# Patient Record
Sex: MALE | Race: WHITE | NOT HISPANIC OR LATINO
[De-identification: names, ages, dates, MRNs, and addresses within clinical notes are randomized per-mention and may not be internally consistent; named-entity substitution may affect disease eponyms.]

---

## 2017-03-03 ENCOUNTER — RX RENEWAL (OUTPATIENT)
Age: 72
End: 2017-03-03

## 2017-03-27 ENCOUNTER — APPOINTMENT (OUTPATIENT)
Dept: PULMONOLOGY | Facility: CLINIC | Age: 72
End: 2017-03-27

## 2017-03-27 VITALS — DIASTOLIC BLOOD PRESSURE: 76 MMHG | WEIGHT: 264 LBS | BODY MASS INDEX: 41.97 KG/M2 | SYSTOLIC BLOOD PRESSURE: 130 MMHG

## 2017-03-27 VITALS — HEART RATE: 53 BPM | OXYGEN SATURATION: 94 %

## 2017-04-13 ENCOUNTER — MESSAGE (OUTPATIENT)
Age: 72
End: 2017-04-13

## 2017-04-19 ENCOUNTER — APPOINTMENT (OUTPATIENT)
Dept: THORACIC SURGERY | Facility: CLINIC | Age: 72
End: 2017-04-19

## 2017-04-19 VITALS
SYSTOLIC BLOOD PRESSURE: 127 MMHG | OXYGEN SATURATION: 96 % | WEIGHT: 262 LBS | DIASTOLIC BLOOD PRESSURE: 73 MMHG | BODY MASS INDEX: 41.61 KG/M2 | HEART RATE: 64 BPM | HEIGHT: 66.5 IN | RESPIRATION RATE: 20 BRPM

## 2017-04-28 ENCOUNTER — OUTPATIENT (OUTPATIENT)
Dept: OUTPATIENT SERVICES | Facility: HOSPITAL | Age: 72
LOS: 1 days | End: 2017-04-28

## 2017-04-28 ENCOUNTER — APPOINTMENT (OUTPATIENT)
Dept: NUCLEAR MEDICINE | Facility: CLINIC | Age: 72
End: 2017-04-28

## 2017-04-28 DIAGNOSIS — Z85.118 PERSONAL HISTORY OF OTHER MALIGNANT NEOPLASM OF BRONCHUS AND LUNG: Chronic | ICD-10-CM

## 2017-04-28 DIAGNOSIS — Z98.89 OTHER SPECIFIED POSTPROCEDURAL STATES: Chronic | ICD-10-CM

## 2017-04-28 DIAGNOSIS — Z00.8 ENCOUNTER FOR OTHER GENERAL EXAMINATION: ICD-10-CM

## 2017-05-15 ENCOUNTER — APPOINTMENT (OUTPATIENT)
Dept: THORACIC SURGERY | Facility: CLINIC | Age: 72
End: 2017-05-15

## 2017-05-15 VITALS
WEIGHT: 262 LBS | SYSTOLIC BLOOD PRESSURE: 128 MMHG | HEIGHT: 66 IN | BODY MASS INDEX: 42.11 KG/M2 | OXYGEN SATURATION: 95 % | RESPIRATION RATE: 20 BRPM | DIASTOLIC BLOOD PRESSURE: 63 MMHG | HEART RATE: 57 BPM

## 2017-05-19 ENCOUNTER — OUTPATIENT (OUTPATIENT)
Dept: OUTPATIENT SERVICES | Facility: HOSPITAL | Age: 72
LOS: 1 days | End: 2017-05-19
Payer: COMMERCIAL

## 2017-05-19 VITALS
RESPIRATION RATE: 16 BRPM | DIASTOLIC BLOOD PRESSURE: 82 MMHG | HEART RATE: 70 BPM | WEIGHT: 260.15 LBS | HEIGHT: 67 IN | SYSTOLIC BLOOD PRESSURE: 134 MMHG | TEMPERATURE: 97 F

## 2017-05-19 DIAGNOSIS — R22.2 LOCALIZED SWELLING, MASS AND LUMP, TRUNK: ICD-10-CM

## 2017-05-19 DIAGNOSIS — Z01.818 ENCOUNTER FOR OTHER PREPROCEDURAL EXAMINATION: ICD-10-CM

## 2017-05-19 DIAGNOSIS — Z98.89 OTHER SPECIFIED POSTPROCEDURAL STATES: Chronic | ICD-10-CM

## 2017-05-19 DIAGNOSIS — Z85.118 PERSONAL HISTORY OF OTHER MALIGNANT NEOPLASM OF BRONCHUS AND LUNG: Chronic | ICD-10-CM

## 2017-05-19 LAB
ANION GAP SERPL CALC-SCNC: 13 MMOL/L — SIGNIFICANT CHANGE UP (ref 5–17)
APTT BLD: 30.1 SEC — SIGNIFICANT CHANGE UP (ref 27.5–37.4)
BASOPHILS # BLD AUTO: 0 K/UL — SIGNIFICANT CHANGE UP (ref 0–0.2)
BASOPHILS NFR BLD AUTO: 0.1 % — SIGNIFICANT CHANGE UP (ref 0–2)
BUN SERPL-MCNC: 25 MG/DL — HIGH (ref 8–20)
CALCIUM SERPL-MCNC: 9.5 MG/DL — SIGNIFICANT CHANGE UP (ref 8.6–10.2)
CHLORIDE SERPL-SCNC: 103 MMOL/L — SIGNIFICANT CHANGE UP (ref 98–107)
CO2 SERPL-SCNC: 26 MMOL/L — SIGNIFICANT CHANGE UP (ref 22–29)
CREAT SERPL-MCNC: 0.81 MG/DL — SIGNIFICANT CHANGE UP (ref 0.5–1.3)
EOSINOPHIL # BLD AUTO: 0.1 K/UL — SIGNIFICANT CHANGE UP (ref 0–0.5)
EOSINOPHIL NFR BLD AUTO: 0.8 % — SIGNIFICANT CHANGE UP (ref 0–5)
GLUCOSE SERPL-MCNC: 109 MG/DL — SIGNIFICANT CHANGE UP (ref 70–115)
HCT VFR BLD CALC: 44.5 % — SIGNIFICANT CHANGE UP (ref 42–52)
HGB BLD-MCNC: 14.9 G/DL — SIGNIFICANT CHANGE UP (ref 14–18)
INR BLD: 0.96 RATIO — SIGNIFICANT CHANGE UP (ref 0.88–1.16)
LYMPHOCYTES # BLD AUTO: 1.5 K/UL — SIGNIFICANT CHANGE UP (ref 1–4.8)
LYMPHOCYTES # BLD AUTO: 21.7 % — SIGNIFICANT CHANGE UP (ref 20–55)
MCHC RBC-ENTMCNC: 28.7 PG — SIGNIFICANT CHANGE UP (ref 27–31)
MCHC RBC-ENTMCNC: 33.5 G/DL — SIGNIFICANT CHANGE UP (ref 32–36)
MCV RBC AUTO: 85.7 FL — SIGNIFICANT CHANGE UP (ref 80–94)
MONOCYTES # BLD AUTO: 0.7 K/UL — SIGNIFICANT CHANGE UP (ref 0–0.8)
MONOCYTES NFR BLD AUTO: 9.4 % — SIGNIFICANT CHANGE UP (ref 3–10)
NEUTROPHILS # BLD AUTO: 4.8 K/UL — SIGNIFICANT CHANGE UP (ref 1.8–8)
NEUTROPHILS NFR BLD AUTO: 68 % — SIGNIFICANT CHANGE UP (ref 37–73)
PLATELET # BLD AUTO: 176 K/UL — SIGNIFICANT CHANGE UP (ref 150–400)
POTASSIUM SERPL-MCNC: 4.2 MMOL/L — SIGNIFICANT CHANGE UP (ref 3.5–5.3)
POTASSIUM SERPL-SCNC: 4.2 MMOL/L — SIGNIFICANT CHANGE UP (ref 3.5–5.3)
PROTHROM AB SERPL-ACNC: 10.6 SEC — SIGNIFICANT CHANGE UP (ref 9.8–12.7)
RBC # BLD: 5.19 M/UL — SIGNIFICANT CHANGE UP (ref 4.6–6.2)
RBC # FLD: 14.8 % — SIGNIFICANT CHANGE UP (ref 11–15.6)
SODIUM SERPL-SCNC: 142 MMOL/L — SIGNIFICANT CHANGE UP (ref 135–145)
WBC # BLD: 7.1 K/UL — SIGNIFICANT CHANGE UP (ref 4.8–10.8)
WBC # FLD AUTO: 7.1 K/UL — SIGNIFICANT CHANGE UP (ref 4.8–10.8)

## 2017-05-19 PROCEDURE — 85730 THROMBOPLASTIN TIME PARTIAL: CPT

## 2017-05-19 PROCEDURE — 85027 COMPLETE CBC AUTOMATED: CPT

## 2017-05-19 PROCEDURE — G0463: CPT

## 2017-05-19 PROCEDURE — 80048 BASIC METABOLIC PNL TOTAL CA: CPT

## 2017-05-19 PROCEDURE — 85610 PROTHROMBIN TIME: CPT

## 2017-05-19 NOTE — H&P PST ADULT - HISTORY OF PRESENT ILLNESS
71 year old male 71 year old male COPD, obesity, HTN, Former smoker,  lung ca S/P R VATS 4/29/16 with several readmissions for recurrent effusion/drainage.  He now present to [facility] for CT guided right lung biopsy.  Patient state on follow up images there was a spot noted on his lung.  He denies Chest pain, cough  reports OSULLIVAN and  is o2 dependent.

## 2017-05-19 NOTE — H&P PST ADULT - NSANTHOSAYNRD_GEN_A_CORE
No. ROXANE screening performed.  STOP BANG Legend: 0-2 = LOW Risk; 3-4 = INTERMEDIATE Risk; 5-8 = HIGH Risk

## 2017-05-19 NOTE — H&P PST ADULT - PSH
History of malignant neoplasm of right bronchus or lung    S/P arthroscopic knee surgery  L x 3  S/P carpal tunnel release  L  S/P foot surgery, right  ganglion x 3

## 2017-05-19 NOTE — H&P PST ADULT - FAMILY HISTORY
<<-----Click on this checkbox to enter Family History Family history of stroke     Sibling  Still living? No  Family history of acute myocardial infarction, Age at diagnosis: 51-60

## 2017-05-19 NOTE — H&P PST ADULT - ASSESSMENT
71 year old male COPD, obesity, HTN, Former smoker,  lung ca S/P R VATS 4/29/16 schedule for CT guided right lung biopsy.

## 2017-05-19 NOTE — H&P PST ADULT - PMH
COPD (chronic obstructive pulmonary disease)    Dyslipidemia    Essential hypertension    Gout    Lung cancer

## 2017-05-25 ENCOUNTER — OUTPATIENT (OUTPATIENT)
Dept: OUTPATIENT SERVICES | Facility: HOSPITAL | Age: 72
LOS: 1 days | End: 2017-05-25
Payer: COMMERCIAL

## 2017-05-25 ENCOUNTER — RESULT REVIEW (OUTPATIENT)
Age: 72
End: 2017-05-25

## 2017-05-25 VITALS
HEART RATE: 60 BPM | SYSTOLIC BLOOD PRESSURE: 116 MMHG | TEMPERATURE: 97 F | DIASTOLIC BLOOD PRESSURE: 63 MMHG | OXYGEN SATURATION: 100 % | WEIGHT: 260.15 LBS | HEIGHT: 67 IN | RESPIRATION RATE: 16 BRPM

## 2017-05-25 VITALS
RESPIRATION RATE: 16 BRPM | HEART RATE: 64 BPM | DIASTOLIC BLOOD PRESSURE: 58 MMHG | SYSTOLIC BLOOD PRESSURE: 118 MMHG | TEMPERATURE: 98 F | OXYGEN SATURATION: 97 %

## 2017-05-25 DIAGNOSIS — R22.0 LOCALIZED SWELLING, MASS AND LUMP, HEAD: ICD-10-CM

## 2017-05-25 DIAGNOSIS — Z85.118 PERSONAL HISTORY OF OTHER MALIGNANT NEOPLASM OF BRONCHUS AND LUNG: Chronic | ICD-10-CM

## 2017-05-25 DIAGNOSIS — Z98.89 OTHER SPECIFIED POSTPROCEDURAL STATES: Chronic | ICD-10-CM

## 2017-05-25 PROCEDURE — 88305 TISSUE EXAM BY PATHOLOGIST: CPT | Mod: 26

## 2017-05-25 PROCEDURE — 32553 INS MARK THOR FOR RT PERQ: CPT

## 2017-05-25 PROCEDURE — 71010: CPT | Mod: 26

## 2017-05-25 PROCEDURE — 77012 CT SCAN FOR NEEDLE BIOPSY: CPT | Mod: 26

## 2017-05-25 PROCEDURE — 88305 TISSUE EXAM BY PATHOLOGIST: CPT

## 2017-05-25 PROCEDURE — 32405: CPT | Mod: RT

## 2017-05-25 PROCEDURE — 88333 PATH CONSLTJ SURG CYTO XM 1: CPT | Mod: 26

## 2017-05-25 PROCEDURE — 71045 X-RAY EXAM CHEST 1 VIEW: CPT

## 2017-05-25 PROCEDURE — 77012 CT SCAN FOR NEEDLE BIOPSY: CPT

## 2017-05-25 PROCEDURE — 88333 PATH CONSLTJ SURG CYTO XM 1: CPT

## 2017-05-25 PROCEDURE — 71010: CPT | Mod: 26,77

## 2017-05-25 NOTE — ASU DISCHARGE PLAN (ADULT/PEDIATRIC). - NOTIFY
Pain not relieved by Medications/chest pain or short of breath come to nearest ED/Bleeding that does not stop/Fever greater than 101

## 2017-05-25 NOTE — ASU DISCHARGE PLAN (ADULT/PEDIATRIC). - MEDICATION SUMMARY - MEDICATIONS TO TAKE
I will START or STAY ON the medications listed below when I get home from the hospital:    aspirin 81 mg oral tablet  -- 1 tab(s) by mouth once a day  -- Indication: For per PMD    naproxen 500 mg oral tablet  -- 1 tab(s) by mouth 2 times a day, As Needed  -- Indication: For per PMD    simvastatin 20 mg oral tablet  -- 1 tab(s) by mouth once a day (at bedtime)  -- Indication: For per PMDper PMD    atenolol 50 mg oral tablet  -- 1 tab(s) by mouth once a day  -- Indication: For per PMD    atenolol 50 mg oral tablet  -- 1 tab(s) by mouth once a day  -- Indication: For per PMD    Advair Diskus 500 mcg-50 mcg inhalation powder  -- 1 puff(s) inhaled 2 times a day  -- Indication: For per PMD    albuterol 90 mcg/inh inhalation aerosol  --  inhaled , As Needed  -- Indication: For per PMD    albuterol-ipratropium 2.5 mg-0.5 mg/3 mL inhalation solution  -- 3 milliliter(s) inhaled every 4 hours while awake, As Needed, Shortness of Breath and/or Wheezing  -- Indication: For per PMD    Daliresp 500 mcg oral tablet  -- 1 tab(s) by mouth once a day  -- Indication: For per PMD    Vitamin D3 1000 intl units oral tablet  -- 1 tab(s) by mouth once a day  -- Indication: For per PMD

## 2017-05-25 NOTE — BRIEF OPERATIVE NOTE - OPERATION/FINDINGS
2 cores of right lung mass with gold fiducial markers.  Biosentry closure used. Prelim pos by Dr Cadet

## 2017-05-26 LAB — SURGICAL PATHOLOGY FINAL REPORT - CH: SIGNIFICANT CHANGE UP

## 2017-06-02 ENCOUNTER — APPOINTMENT (OUTPATIENT)
Dept: THORACIC SURGERY | Facility: CLINIC | Age: 72
End: 2017-06-02

## 2017-06-02 VITALS
HEART RATE: 92 BPM | DIASTOLIC BLOOD PRESSURE: 81 MMHG | BODY MASS INDEX: 42.11 KG/M2 | HEIGHT: 66 IN | WEIGHT: 262 LBS | OXYGEN SATURATION: 96 % | RESPIRATION RATE: 20 BRPM | SYSTOLIC BLOOD PRESSURE: 164 MMHG

## 2017-06-05 ENCOUNTER — APPOINTMENT (OUTPATIENT)
Dept: PULMONOLOGY | Facility: CLINIC | Age: 72
End: 2017-06-05

## 2017-06-05 VITALS — WEIGHT: 270 LBS | BODY MASS INDEX: 43.58 KG/M2

## 2017-06-05 VITALS — SYSTOLIC BLOOD PRESSURE: 152 MMHG | DIASTOLIC BLOOD PRESSURE: 74 MMHG | OXYGEN SATURATION: 93 % | HEART RATE: 71 BPM

## 2017-06-05 RX ORDER — TIOTROPIUM BROMIDE 18 UG/1
18 CAPSULE ORAL; RESPIRATORY (INHALATION) DAILY
Qty: 1 | Refills: 5 | Status: DISCONTINUED | COMMUNITY
Start: 2017-06-05 | End: 2017-06-05

## 2017-06-12 ENCOUNTER — RECORD ABSTRACTING (OUTPATIENT)
Age: 72
End: 2017-06-12

## 2017-06-22 LAB — PATH REPORT ADDENDUM.SYNOPTIC DOC: SIGNIFICANT CHANGE UP

## 2017-06-28 ENCOUNTER — APPOINTMENT (OUTPATIENT)
Dept: PULMONOLOGY | Facility: CLINIC | Age: 72
End: 2017-06-28

## 2017-06-28 VITALS — OXYGEN SATURATION: 95 % | HEART RATE: 86 BPM

## 2017-06-28 VITALS — DIASTOLIC BLOOD PRESSURE: 80 MMHG | BODY MASS INDEX: 42.61 KG/M2 | WEIGHT: 264 LBS | SYSTOLIC BLOOD PRESSURE: 160 MMHG

## 2017-06-28 VITALS — OXYGEN SATURATION: 86 %

## 2017-06-28 DIAGNOSIS — R07.81 PLEURODYNIA: ICD-10-CM

## 2017-06-28 RX ORDER — CEFUROXIME AXETIL 500 MG/1
500 TABLET ORAL
Qty: 10 | Refills: 6 | Status: DISCONTINUED | COMMUNITY
Start: 2017-04-13 | End: 2017-06-28

## 2017-06-28 RX ORDER — ONDANSETRON 8 MG/1
8 TABLET, ORALLY DISINTEGRATING ORAL
Qty: 30 | Refills: 0 | Status: DISCONTINUED | COMMUNITY
Start: 2017-06-12

## 2017-06-28 RX ORDER — NEOMYCIN SULFATE, POLYMYXIN B SULFATE AND HYDROCORTISONE 3.5; 10000; 1 MG/ML; [IU]/ML; MG/ML
3.5-10000-1 SOLUTION AURICULAR (OTIC)
Qty: 10 | Refills: 0 | Status: DISCONTINUED | COMMUNITY
Start: 2017-05-26

## 2017-06-28 RX ORDER — PREDNISONE 10 MG/1
10 TABLET ORAL
Qty: 30 | Refills: 1 | Status: DISCONTINUED | COMMUNITY
Start: 2017-06-05 | End: 2017-06-28

## 2017-06-28 RX ORDER — AZITHROMYCIN 250 MG/1
250 TABLET, FILM COATED ORAL
Qty: 6 | Refills: 0 | Status: DISCONTINUED | COMMUNITY
Start: 2017-05-26

## 2017-07-04 ENCOUNTER — FORM ENCOUNTER (OUTPATIENT)
Age: 72
End: 2017-07-04

## 2017-07-05 ENCOUNTER — OUTPATIENT (OUTPATIENT)
Dept: OUTPATIENT SERVICES | Facility: HOSPITAL | Age: 72
LOS: 1 days | End: 2017-07-05
Payer: COMMERCIAL

## 2017-07-05 ENCOUNTER — APPOINTMENT (OUTPATIENT)
Dept: CT IMAGING | Facility: CLINIC | Age: 72
End: 2017-07-05

## 2017-07-05 DIAGNOSIS — Z98.89 OTHER SPECIFIED POSTPROCEDURAL STATES: Chronic | ICD-10-CM

## 2017-07-05 DIAGNOSIS — Z85.118 PERSONAL HISTORY OF OTHER MALIGNANT NEOPLASM OF BRONCHUS AND LUNG: Chronic | ICD-10-CM

## 2017-07-05 DIAGNOSIS — C34.31 MALIGNANT NEOPLASM OF LOWER LOBE, RIGHT BRONCHUS OR LUNG: ICD-10-CM

## 2017-07-05 DIAGNOSIS — Z00.8 ENCOUNTER FOR OTHER GENERAL EXAMINATION: ICD-10-CM

## 2017-07-05 DIAGNOSIS — R07.81 PLEURODYNIA: ICD-10-CM

## 2017-07-05 DIAGNOSIS — J44.9 CHRONIC OBSTRUCTIVE PULMONARY DISEASE, UNSPECIFIED: ICD-10-CM

## 2017-07-05 PROCEDURE — 71275 CT ANGIOGRAPHY CHEST: CPT

## 2017-09-07 ENCOUNTER — APPOINTMENT (OUTPATIENT)
Dept: PULMONOLOGY | Facility: CLINIC | Age: 72
End: 2017-09-07

## 2017-09-07 ENCOUNTER — APPOINTMENT (OUTPATIENT)
Dept: PULMONOLOGY | Facility: CLINIC | Age: 72
End: 2017-09-07
Payer: MEDICARE

## 2017-09-07 VITALS — OXYGEN SATURATION: 93 % | DIASTOLIC BLOOD PRESSURE: 60 MMHG | HEART RATE: 71 BPM | SYSTOLIC BLOOD PRESSURE: 126 MMHG

## 2017-09-07 VITALS — WEIGHT: 250 LBS | BODY MASS INDEX: 40.35 KG/M2

## 2017-09-07 PROCEDURE — 99215 OFFICE O/P EST HI 40 MIN: CPT | Mod: 25

## 2017-09-07 PROCEDURE — 94010 BREATHING CAPACITY TEST: CPT

## 2017-09-07 RX ORDER — ALLOPURINOL 300 MG/1
300 TABLET ORAL
Refills: 0 | Status: ACTIVE | COMMUNITY

## 2017-09-07 RX ORDER — COLCHICINE 0.6 MG/1
0.6 TABLET ORAL
Refills: 0 | Status: ACTIVE | COMMUNITY

## 2017-09-28 ENCOUNTER — APPOINTMENT (OUTPATIENT)
Dept: PULMONOLOGY | Facility: CLINIC | Age: 72
End: 2017-09-28

## 2017-10-02 ENCOUNTER — OUTPATIENT (OUTPATIENT)
Dept: OUTPATIENT SERVICES | Facility: HOSPITAL | Age: 72
LOS: 1 days | End: 2017-10-02

## 2017-10-02 ENCOUNTER — APPOINTMENT (OUTPATIENT)
Dept: NUCLEAR MEDICINE | Facility: CLINIC | Age: 72
End: 2017-10-02
Payer: MEDICARE

## 2017-10-02 DIAGNOSIS — Z98.89 OTHER SPECIFIED POSTPROCEDURAL STATES: Chronic | ICD-10-CM

## 2017-10-02 DIAGNOSIS — Z00.8 ENCOUNTER FOR OTHER GENERAL EXAMINATION: ICD-10-CM

## 2017-10-02 DIAGNOSIS — Z85.118 PERSONAL HISTORY OF OTHER MALIGNANT NEOPLASM OF BRONCHUS AND LUNG: Chronic | ICD-10-CM

## 2017-10-02 PROCEDURE — 78815 PET IMAGE W/CT SKULL-THIGH: CPT | Mod: 26,PS

## 2017-10-31 ENCOUNTER — RX RENEWAL (OUTPATIENT)
Age: 72
End: 2017-10-31

## 2017-12-20 ENCOUNTER — APPOINTMENT (OUTPATIENT)
Dept: PULMONOLOGY | Facility: CLINIC | Age: 72
End: 2017-12-20
Payer: MEDICARE

## 2017-12-20 VITALS
WEIGHT: 249 LBS | DIASTOLIC BLOOD PRESSURE: 64 MMHG | SYSTOLIC BLOOD PRESSURE: 136 MMHG | HEART RATE: 80 BPM | BODY MASS INDEX: 40.19 KG/M2 | OXYGEN SATURATION: 92 %

## 2017-12-20 PROCEDURE — 99214 OFFICE O/P EST MOD 30 MIN: CPT

## 2017-12-20 RX ORDER — ROFLUMILAST 500 UG/1
500 TABLET ORAL
Qty: 90 | Refills: 0 | Status: DISCONTINUED | COMMUNITY
Start: 2017-10-31 | End: 2017-12-20

## 2017-12-20 RX ORDER — NAPROXEN 500 MG/1
500 TABLET ORAL
Qty: 60 | Refills: 0 | Status: DISCONTINUED | COMMUNITY
Start: 2016-09-19 | End: 2017-12-20

## 2017-12-20 RX ORDER — FUROSEMIDE 40 MG/1
40 TABLET ORAL
Refills: 0 | Status: DISCONTINUED | COMMUNITY
End: 2017-12-20

## 2018-01-23 ENCOUNTER — APPOINTMENT (OUTPATIENT)
Dept: NUCLEAR MEDICINE | Facility: CLINIC | Age: 73
End: 2018-01-23

## 2018-01-31 ENCOUNTER — APPOINTMENT (OUTPATIENT)
Dept: NUCLEAR MEDICINE | Facility: CLINIC | Age: 73
End: 2018-01-31
Payer: MEDICARE

## 2018-01-31 ENCOUNTER — OUTPATIENT (OUTPATIENT)
Dept: OUTPATIENT SERVICES | Facility: HOSPITAL | Age: 73
LOS: 1 days | End: 2018-01-31

## 2018-01-31 DIAGNOSIS — Z98.89 OTHER SPECIFIED POSTPROCEDURAL STATES: Chronic | ICD-10-CM

## 2018-01-31 DIAGNOSIS — Z85.118 PERSONAL HISTORY OF OTHER MALIGNANT NEOPLASM OF BRONCHUS AND LUNG: Chronic | ICD-10-CM

## 2018-01-31 DIAGNOSIS — Z00.8 ENCOUNTER FOR OTHER GENERAL EXAMINATION: ICD-10-CM

## 2018-01-31 PROCEDURE — 78815 PET IMAGE W/CT SKULL-THIGH: CPT | Mod: 26,PS

## 2018-02-12 ENCOUNTER — OTHER (OUTPATIENT)
Age: 73
End: 2018-02-12

## 2018-02-13 ENCOUNTER — APPOINTMENT (OUTPATIENT)
Dept: RHEUMATOLOGY | Facility: CLINIC | Age: 73
End: 2018-02-13
Payer: MEDICARE

## 2018-02-13 ENCOUNTER — APPOINTMENT (OUTPATIENT)
Dept: RHEUMATOLOGY | Facility: CLINIC | Age: 73
End: 2018-02-13

## 2018-02-13 VITALS
WEIGHT: 244 LBS | OXYGEN SATURATION: 92 % | RESPIRATION RATE: 16 BRPM | HEIGHT: 66 IN | SYSTOLIC BLOOD PRESSURE: 120 MMHG | HEART RATE: 86 BPM | BODY MASS INDEX: 39.21 KG/M2 | DIASTOLIC BLOOD PRESSURE: 70 MMHG

## 2018-02-13 PROCEDURE — 36415 COLL VENOUS BLD VENIPUNCTURE: CPT

## 2018-02-13 PROCEDURE — 99205 OFFICE O/P NEW HI 60 MIN: CPT | Mod: 25

## 2018-02-13 RX ORDER — KETOCONAZOLE 20 MG/G
2 CREAM TOPICAL
Qty: 60 | Refills: 0 | Status: COMPLETED | COMMUNITY
Start: 2018-01-29

## 2018-02-13 RX ORDER — PANTOPRAZOLE 40 MG/1
40 TABLET, DELAYED RELEASE ORAL
Qty: 90 | Refills: 0 | Status: COMPLETED | COMMUNITY
Start: 2018-02-02

## 2018-02-13 RX ORDER — CEFUROXIME AXETIL 500 MG/1
500 TABLET ORAL
Qty: 10 | Refills: 3 | Status: DISCONTINUED | COMMUNITY
Start: 2017-12-20 | End: 2018-02-13

## 2018-02-13 RX ORDER — METHYLPREDNISOLONE 4 MG/1
4 TABLET ORAL
Qty: 21 | Refills: 0 | Status: COMPLETED | COMMUNITY
Start: 2017-10-04

## 2018-02-13 RX ORDER — FLUCONAZOLE 100 MG/1
100 TABLET ORAL
Qty: 36 | Refills: 0 | Status: COMPLETED | COMMUNITY
Start: 2018-02-12

## 2018-02-13 RX ORDER — CEPHALEXIN 500 MG/1
500 CAPSULE ORAL
Qty: 20 | Refills: 0 | Status: COMPLETED | COMMUNITY
Start: 2017-09-05

## 2018-02-13 RX ORDER — METOPROLOL SUCCINATE 25 MG/1
25 TABLET, EXTENDED RELEASE ORAL
Qty: 90 | Refills: 0 | Status: COMPLETED | COMMUNITY
Start: 2017-09-07

## 2018-02-13 RX ORDER — DICLOFENAC SODIUM 10 MG/G
1 GEL TOPICAL
Qty: 100 | Refills: 0 | Status: COMPLETED | COMMUNITY
Start: 2017-10-06

## 2018-02-13 RX ORDER — CLOTRIMAZOLE 10 MG/1
10 LOZENGE ORAL
Qty: 35 | Refills: 0 | Status: COMPLETED | COMMUNITY
Start: 2017-12-05

## 2018-02-13 RX ORDER — GABAPENTIN 300 MG/1
300 CAPSULE ORAL
Qty: 90 | Refills: 0 | Status: COMPLETED | COMMUNITY
Start: 2017-09-14

## 2018-02-13 RX ORDER — SUCRALFATE 1 G/10ML
1 SUSPENSION ORAL
Qty: 300 | Refills: 0 | Status: COMPLETED | COMMUNITY
Start: 2017-07-26

## 2018-02-22 ENCOUNTER — RX RENEWAL (OUTPATIENT)
Age: 73
End: 2018-02-22

## 2018-02-22 ENCOUNTER — APPOINTMENT (OUTPATIENT)
Dept: RHEUMATOLOGY | Facility: CLINIC | Age: 73
End: 2018-02-22
Payer: MEDICARE

## 2018-02-22 VITALS
TEMPERATURE: 97.8 F | SYSTOLIC BLOOD PRESSURE: 118 MMHG | OXYGEN SATURATION: 92 % | RESPIRATION RATE: 16 BRPM | DIASTOLIC BLOOD PRESSURE: 78 MMHG | HEART RATE: 63 BPM

## 2018-02-22 DIAGNOSIS — G62.0 DRUG-INDUCED POLYNEUROPATHY: ICD-10-CM

## 2018-02-22 DIAGNOSIS — M25.50 PAIN IN UNSPECIFIED JOINT: ICD-10-CM

## 2018-02-22 DIAGNOSIS — M13.0 POLYARTHRITIS, UNSPECIFIED: ICD-10-CM

## 2018-02-22 DIAGNOSIS — G89.18 PAIN IN UNSPECIFIED JOINT: ICD-10-CM

## 2018-02-22 DIAGNOSIS — T45.1X5A DRUG-INDUCED POLYNEUROPATHY: ICD-10-CM

## 2018-02-22 PROCEDURE — 99214 OFFICE O/P EST MOD 30 MIN: CPT

## 2018-02-22 RX ORDER — PREGABALIN 50 MG/1
50 CAPSULE ORAL
Qty: 60 | Refills: 0 | Status: ACTIVE | COMMUNITY
Start: 2017-09-26

## 2018-04-05 ENCOUNTER — APPOINTMENT (OUTPATIENT)
Dept: RHEUMATOLOGY | Facility: CLINIC | Age: 73
End: 2018-04-05

## 2018-04-07 ENCOUNTER — APPOINTMENT (OUTPATIENT)
Dept: RHEUMATOLOGY | Facility: CLINIC | Age: 73
End: 2018-04-07

## 2018-05-14 ENCOUNTER — RX RENEWAL (OUTPATIENT)
Age: 73
End: 2018-05-14

## 2018-05-16 ENCOUNTER — APPOINTMENT (OUTPATIENT)
Dept: PULMONOLOGY | Facility: CLINIC | Age: 73
End: 2018-05-16
Payer: MEDICARE

## 2018-05-16 VITALS
BODY MASS INDEX: 40.5 KG/M2 | WEIGHT: 252 LBS | DIASTOLIC BLOOD PRESSURE: 74 MMHG | HEART RATE: 64 BPM | OXYGEN SATURATION: 93 % | SYSTOLIC BLOOD PRESSURE: 130 MMHG | HEIGHT: 66 IN

## 2018-05-16 PROCEDURE — 99214 OFFICE O/P EST MOD 30 MIN: CPT

## 2018-05-17 ENCOUNTER — APPOINTMENT (OUTPATIENT)
Dept: NUCLEAR MEDICINE | Facility: CLINIC | Age: 73
End: 2018-05-17
Payer: MEDICARE

## 2018-05-17 ENCOUNTER — OUTPATIENT (OUTPATIENT)
Dept: OUTPATIENT SERVICES | Facility: HOSPITAL | Age: 73
LOS: 1 days | End: 2018-05-17

## 2018-05-17 DIAGNOSIS — Z85.118 PERSONAL HISTORY OF OTHER MALIGNANT NEOPLASM OF BRONCHUS AND LUNG: Chronic | ICD-10-CM

## 2018-05-17 DIAGNOSIS — Z98.89 OTHER SPECIFIED POSTPROCEDURAL STATES: Chronic | ICD-10-CM

## 2018-05-17 DIAGNOSIS — C34.2 MALIGNANT NEOPLASM OF MIDDLE LOBE, BRONCHUS OR LUNG: ICD-10-CM

## 2018-05-17 PROCEDURE — 78815 PET IMAGE W/CT SKULL-THIGH: CPT | Mod: 26,PS

## 2018-06-12 ENCOUNTER — RX RENEWAL (OUTPATIENT)
Age: 73
End: 2018-06-12

## 2018-06-26 RX ORDER — SULFAMETHOXAZOLE AND TRIMETHOPRIM 800; 160 MG/1; MG/1
800-160 TABLET ORAL
Qty: 14 | Refills: 0 | Status: DISCONTINUED | COMMUNITY
Start: 2017-12-29 | End: 2018-06-26

## 2018-06-27 ENCOUNTER — APPOINTMENT (OUTPATIENT)
Dept: PULMONOLOGY | Facility: CLINIC | Age: 73
End: 2018-06-27
Payer: MEDICARE

## 2018-06-27 VITALS — HEART RATE: 72 BPM | SYSTOLIC BLOOD PRESSURE: 132 MMHG | OXYGEN SATURATION: 97 % | DIASTOLIC BLOOD PRESSURE: 66 MMHG

## 2018-06-27 VITALS — BODY MASS INDEX: 39.71 KG/M2 | WEIGHT: 246 LBS

## 2018-06-27 DIAGNOSIS — R60.9 EDEMA, UNSPECIFIED: ICD-10-CM

## 2018-06-27 PROCEDURE — 94010 BREATHING CAPACITY TEST: CPT

## 2018-06-27 PROCEDURE — 99215 OFFICE O/P EST HI 40 MIN: CPT | Mod: 25

## 2018-06-28 ENCOUNTER — OTHER (OUTPATIENT)
Age: 73
End: 2018-06-28

## 2018-07-11 ENCOUNTER — RX RENEWAL (OUTPATIENT)
Age: 73
End: 2018-07-11

## 2018-07-24 PROBLEM — M10.9 GOUT, UNSPECIFIED: Chronic | Status: ACTIVE | Noted: 2017-05-19

## 2018-07-24 PROBLEM — C34.90 MALIGNANT NEOPLASM OF UNSPECIFIED PART OF UNSPECIFIED BRONCHUS OR LUNG: Chronic | Status: ACTIVE | Noted: 2017-05-19

## 2018-07-27 ENCOUNTER — APPOINTMENT (OUTPATIENT)
Dept: NUCLEAR MEDICINE | Facility: CLINIC | Age: 73
End: 2018-07-27
Payer: MEDICARE

## 2018-07-27 ENCOUNTER — OUTPATIENT (OUTPATIENT)
Dept: OUTPATIENT SERVICES | Facility: HOSPITAL | Age: 73
LOS: 1 days | End: 2018-07-27

## 2018-07-27 ENCOUNTER — APPOINTMENT (OUTPATIENT)
Dept: ULTRASOUND IMAGING | Facility: CLINIC | Age: 73
End: 2018-07-27
Payer: MEDICARE

## 2018-07-27 DIAGNOSIS — R60.9 EDEMA, UNSPECIFIED: ICD-10-CM

## 2018-07-27 DIAGNOSIS — Z98.89 OTHER SPECIFIED POSTPROCEDURAL STATES: Chronic | ICD-10-CM

## 2018-07-27 DIAGNOSIS — Z85.118 PERSONAL HISTORY OF OTHER MALIGNANT NEOPLASM OF BRONCHUS AND LUNG: Chronic | ICD-10-CM

## 2018-07-27 PROCEDURE — 78815 PET IMAGE W/CT SKULL-THIGH: CPT | Mod: 26,PS

## 2018-07-27 PROCEDURE — 93970 EXTREMITY STUDY: CPT | Mod: 26

## 2018-09-26 ENCOUNTER — OUTPATIENT (OUTPATIENT)
Dept: OUTPATIENT SERVICES | Facility: HOSPITAL | Age: 73
LOS: 1 days | End: 2018-09-26

## 2018-09-26 ENCOUNTER — APPOINTMENT (OUTPATIENT)
Dept: NUCLEAR MEDICINE | Facility: CLINIC | Age: 73
End: 2018-09-26
Payer: MEDICARE

## 2018-09-26 DIAGNOSIS — Z98.89 OTHER SPECIFIED POSTPROCEDURAL STATES: Chronic | ICD-10-CM

## 2018-09-26 DIAGNOSIS — Z85.118 PERSONAL HISTORY OF OTHER MALIGNANT NEOPLASM OF BRONCHUS AND LUNG: Chronic | ICD-10-CM

## 2018-09-26 DIAGNOSIS — Z00.8 ENCOUNTER FOR OTHER GENERAL EXAMINATION: ICD-10-CM

## 2018-09-26 PROCEDURE — 78815 PET IMAGE W/CT SKULL-THIGH: CPT | Mod: 26,PS

## 2018-10-15 ENCOUNTER — INBOUND DOCUMENT (OUTPATIENT)
Age: 73
End: 2018-10-15

## 2018-10-25 ENCOUNTER — APPOINTMENT (OUTPATIENT)
Dept: PULMONOLOGY | Facility: CLINIC | Age: 73
End: 2018-10-25

## 2018-11-29 ENCOUNTER — APPOINTMENT (OUTPATIENT)
Dept: PULMONOLOGY | Facility: CLINIC | Age: 73
End: 2018-11-29
Payer: MEDICARE

## 2018-11-29 ENCOUNTER — INPATIENT (INPATIENT)
Facility: HOSPITAL | Age: 73
LOS: 3 days | Discharge: HOME HEALTH | DRG: 191 | End: 2018-12-03
Attending: HOSPITALIST | Admitting: HOSPITALIST
Payer: COMMERCIAL

## 2018-11-29 VITALS — DIASTOLIC BLOOD PRESSURE: 70 MMHG | HEART RATE: 72 BPM | OXYGEN SATURATION: 90 % | SYSTOLIC BLOOD PRESSURE: 130 MMHG

## 2018-11-29 VITALS
DIASTOLIC BLOOD PRESSURE: 61 MMHG | SYSTOLIC BLOOD PRESSURE: 109 MMHG | HEIGHT: 67 IN | OXYGEN SATURATION: 96 % | TEMPERATURE: 98 F | WEIGHT: 246.92 LBS | HEART RATE: 60 BPM | RESPIRATION RATE: 18 BRPM

## 2018-11-29 VITALS — BODY MASS INDEX: 39.87 KG/M2 | WEIGHT: 247 LBS

## 2018-11-29 DIAGNOSIS — Z98.89 OTHER SPECIFIED POSTPROCEDURAL STATES: Chronic | ICD-10-CM

## 2018-11-29 DIAGNOSIS — Z85.118 PERSONAL HISTORY OF OTHER MALIGNANT NEOPLASM OF BRONCHUS AND LUNG: Chronic | ICD-10-CM

## 2018-11-29 DIAGNOSIS — J44.1 CHRONIC OBSTRUCTIVE PULMONARY DISEASE WITH (ACUTE) EXACERBATION: ICD-10-CM

## 2018-11-29 LAB
ALBUMIN SERPL ELPH-MCNC: 4.1 G/DL — SIGNIFICANT CHANGE UP (ref 3.3–5.2)
ALP SERPL-CCNC: 74 U/L — SIGNIFICANT CHANGE UP (ref 40–120)
ALT FLD-CCNC: 29 U/L — SIGNIFICANT CHANGE UP
ANION GAP SERPL CALC-SCNC: 11 MMOL/L — SIGNIFICANT CHANGE UP (ref 5–17)
APTT BLD: 31.6 SEC — SIGNIFICANT CHANGE UP (ref 27.5–36.3)
AST SERPL-CCNC: 27 U/L — SIGNIFICANT CHANGE UP
BASOPHILS # BLD AUTO: 0 K/UL — SIGNIFICANT CHANGE UP (ref 0–0.2)
BASOPHILS NFR BLD AUTO: 0.2 % — SIGNIFICANT CHANGE UP (ref 0–2)
BILIRUB SERPL-MCNC: 0.8 MG/DL — SIGNIFICANT CHANGE UP (ref 0.4–2)
BUN SERPL-MCNC: 14 MG/DL — SIGNIFICANT CHANGE UP (ref 8–20)
CALCIUM SERPL-MCNC: 9.1 MG/DL — SIGNIFICANT CHANGE UP (ref 8.6–10.2)
CHLORIDE SERPL-SCNC: 105 MMOL/L — SIGNIFICANT CHANGE UP (ref 98–107)
CO2 SERPL-SCNC: 27 MMOL/L — SIGNIFICANT CHANGE UP (ref 22–29)
CREAT SERPL-MCNC: 0.9 MG/DL — SIGNIFICANT CHANGE UP (ref 0.5–1.3)
EOSINOPHIL # BLD AUTO: 0.2 K/UL — SIGNIFICANT CHANGE UP (ref 0–0.5)
EOSINOPHIL NFR BLD AUTO: 2.3 % — SIGNIFICANT CHANGE UP (ref 0–5)
GLUCOSE SERPL-MCNC: 97 MG/DL — SIGNIFICANT CHANGE UP (ref 70–115)
HCT VFR BLD CALC: 42.1 % — SIGNIFICANT CHANGE UP (ref 42–52)
HGB BLD-MCNC: 13.5 G/DL — LOW (ref 14–18)
INR BLD: 1.08 RATIO — SIGNIFICANT CHANGE UP (ref 0.88–1.16)
LYMPHOCYTES # BLD AUTO: 1.4 K/UL — SIGNIFICANT CHANGE UP (ref 1–4.8)
LYMPHOCYTES # BLD AUTO: 21.4 % — SIGNIFICANT CHANGE UP (ref 20–55)
MCHC RBC-ENTMCNC: 28.7 PG — SIGNIFICANT CHANGE UP (ref 27–31)
MCHC RBC-ENTMCNC: 32.1 G/DL — SIGNIFICANT CHANGE UP (ref 32–36)
MCV RBC AUTO: 89.4 FL — SIGNIFICANT CHANGE UP (ref 80–94)
MONOCYTES # BLD AUTO: 1 K/UL — HIGH (ref 0–0.8)
MONOCYTES NFR BLD AUTO: 14.5 % — HIGH (ref 3–10)
NEUTROPHILS # BLD AUTO: 4 K/UL — SIGNIFICANT CHANGE UP (ref 1.8–8)
NEUTROPHILS NFR BLD AUTO: 61 % — SIGNIFICANT CHANGE UP (ref 37–73)
NT-PROBNP SERPL-SCNC: 225 PG/ML — SIGNIFICANT CHANGE UP (ref 0–300)
PLATELET # BLD AUTO: 165 K/UL — SIGNIFICANT CHANGE UP (ref 150–400)
POTASSIUM SERPL-MCNC: 4.6 MMOL/L — SIGNIFICANT CHANGE UP (ref 3.5–5.3)
POTASSIUM SERPL-SCNC: 4.6 MMOL/L — SIGNIFICANT CHANGE UP (ref 3.5–5.3)
PROT SERPL-MCNC: 6.3 G/DL — LOW (ref 6.6–8.7)
PROTHROM AB SERPL-ACNC: 12.5 SEC — SIGNIFICANT CHANGE UP (ref 10–12.9)
RBC # BLD: 4.71 M/UL — SIGNIFICANT CHANGE UP (ref 4.6–6.2)
RBC # FLD: 18.5 % — HIGH (ref 11–15.6)
SODIUM SERPL-SCNC: 143 MMOL/L — SIGNIFICANT CHANGE UP (ref 135–145)
TROPONIN T SERPL-MCNC: <0.01 NG/ML — SIGNIFICANT CHANGE UP (ref 0–0.06)
WBC # BLD: 6.6 K/UL — SIGNIFICANT CHANGE UP (ref 4.8–10.8)
WBC # FLD AUTO: 6.6 K/UL — SIGNIFICANT CHANGE UP (ref 4.8–10.8)

## 2018-11-29 PROCEDURE — 99215 OFFICE O/P EST HI 40 MIN: CPT | Mod: 25

## 2018-11-29 PROCEDURE — 93970 EXTREMITY STUDY: CPT | Mod: 26

## 2018-11-29 PROCEDURE — 99223 1ST HOSP IP/OBS HIGH 75: CPT | Mod: GC

## 2018-11-29 PROCEDURE — 99285 EMERGENCY DEPT VISIT HI MDM: CPT

## 2018-11-29 PROCEDURE — 94664 DEMO&/EVAL PT USE INHALER: CPT | Mod: 59

## 2018-11-29 PROCEDURE — 93010 ELECTROCARDIOGRAM REPORT: CPT

## 2018-11-29 PROCEDURE — 71045 X-RAY EXAM CHEST 1 VIEW: CPT | Mod: 26

## 2018-11-29 PROCEDURE — 94060 EVALUATION OF WHEEZING: CPT

## 2018-11-29 PROCEDURE — 71275 CT ANGIOGRAPHY CHEST: CPT | Mod: 26

## 2018-11-29 RX ORDER — ALBUTEROL 90 UG/1
2.5 AEROSOL, METERED ORAL
Qty: 0 | Refills: 0 | Status: COMPLETED | OUTPATIENT
Start: 2018-11-29 | End: 2018-11-29

## 2018-11-29 RX ORDER — AZITHROMYCIN 500 MG/1
250 TABLET, FILM COATED ORAL DAILY
Qty: 0 | Refills: 0 | Status: DISCONTINUED | OUTPATIENT
Start: 2018-11-30 | End: 2018-12-01

## 2018-11-29 RX ORDER — ATENOLOL 25 MG/1
50 TABLET ORAL DAILY
Qty: 0 | Refills: 0 | Status: DISCONTINUED | OUTPATIENT
Start: 2018-11-29 | End: 2018-11-29

## 2018-11-29 RX ORDER — PANTOPRAZOLE SODIUM 20 MG/1
1 TABLET, DELAYED RELEASE ORAL
Qty: 0 | Refills: 0 | COMMUNITY

## 2018-11-29 RX ORDER — FLUTICASONE PROPIONATE AND SALMETEROL 50; 250 UG/1; UG/1
1 POWDER ORAL; RESPIRATORY (INHALATION)
Qty: 0 | Refills: 0 | COMMUNITY

## 2018-11-29 RX ORDER — ALLOPURINOL 300 MG
1 TABLET ORAL
Qty: 0 | Refills: 0 | COMMUNITY

## 2018-11-29 RX ORDER — ENOXAPARIN SODIUM 100 MG/ML
40 INJECTION SUBCUTANEOUS EVERY 24 HOURS
Qty: 0 | Refills: 0 | Status: DISCONTINUED | OUTPATIENT
Start: 2018-11-29 | End: 2018-12-03

## 2018-11-29 RX ORDER — SIMVASTATIN 20 MG/1
20 TABLET, FILM COATED ORAL AT BEDTIME
Qty: 0 | Refills: 0 | Status: DISCONTINUED | OUTPATIENT
Start: 2018-11-29 | End: 2018-12-03

## 2018-11-29 RX ORDER — ROFLUMILAST 500 UG/1
1 TABLET ORAL
Qty: 0 | Refills: 0 | COMMUNITY

## 2018-11-29 RX ORDER — ASPIRIN/CALCIUM CARB/MAGNESIUM 324 MG
1 TABLET ORAL
Qty: 0 | Refills: 0 | COMMUNITY

## 2018-11-29 RX ORDER — ATENOLOL 25 MG/1
1 TABLET ORAL
Qty: 0 | Refills: 0 | COMMUNITY

## 2018-11-29 RX ORDER — ASPIRIN/CALCIUM CARB/MAGNESIUM 324 MG
81 TABLET ORAL DAILY
Qty: 0 | Refills: 0 | Status: DISCONTINUED | OUTPATIENT
Start: 2018-11-29 | End: 2018-12-03

## 2018-11-29 RX ORDER — ALLOPURINOL 300 MG
300 TABLET ORAL DAILY
Qty: 0 | Refills: 0 | Status: DISCONTINUED | OUTPATIENT
Start: 2018-11-29 | End: 2018-12-03

## 2018-11-29 RX ORDER — FUROSEMIDE 40 MG
40 TABLET ORAL DAILY
Qty: 0 | Refills: 0 | Status: DISCONTINUED | OUTPATIENT
Start: 2018-11-29 | End: 2018-12-03

## 2018-11-29 RX ORDER — ALBUTEROL 90 UG/1
2.5 AEROSOL, METERED ORAL
Qty: 0 | Refills: 0 | Status: DISCONTINUED | OUTPATIENT
Start: 2018-11-29 | End: 2018-12-03

## 2018-11-29 RX ORDER — CHOLECALCIFEROL (VITAMIN D3) 125 MCG
1 CAPSULE ORAL
Qty: 0 | Refills: 0 | COMMUNITY

## 2018-11-29 RX ORDER — ATENOLOL 25 MG/1
50 TABLET ORAL DAILY
Qty: 0 | Refills: 0 | Status: DISCONTINUED | OUTPATIENT
Start: 2018-11-29 | End: 2018-12-03

## 2018-11-29 RX ORDER — IPRATROPIUM/ALBUTEROL SULFATE 18-103MCG
3 AEROSOL WITH ADAPTER (GRAM) INHALATION EVERY 4 HOURS
Qty: 0 | Refills: 0 | Status: DISCONTINUED | OUTPATIENT
Start: 2018-11-29 | End: 2018-12-03

## 2018-11-29 RX ORDER — SIMVASTATIN 20 MG/1
1 TABLET, FILM COATED ORAL
Qty: 0 | Refills: 0 | COMMUNITY

## 2018-11-29 RX ORDER — CHOLECALCIFEROL (VITAMIN D3) 125 MCG
1000 CAPSULE ORAL DAILY
Qty: 0 | Refills: 0 | Status: DISCONTINUED | OUTPATIENT
Start: 2018-11-29 | End: 2018-12-03

## 2018-11-29 RX ORDER — IPRATROPIUM/ALBUTEROL SULFATE 18-103MCG
3 AEROSOL WITH ADAPTER (GRAM) INHALATION ONCE
Qty: 0 | Refills: 0 | Status: COMPLETED | OUTPATIENT
Start: 2018-11-29 | End: 2018-11-29

## 2018-11-29 RX ADMIN — Medication 50 MILLIGRAM(S): at 22:48

## 2018-11-29 RX ADMIN — ALBUTEROL 2.5 MILLIGRAM(S): 90 AEROSOL, METERED ORAL at 19:19

## 2018-11-29 RX ADMIN — Medication 125 MILLIGRAM(S): at 19:18

## 2018-11-29 RX ADMIN — Medication 3 MILLILITER(S): at 19:19

## 2018-11-29 RX ADMIN — Medication 3 MILLILITER(S): at 23:59

## 2018-11-29 RX ADMIN — ALBUTEROL 2.5 MILLIGRAM(S): 90 AEROSOL, METERED ORAL at 19:18

## 2018-11-29 RX ADMIN — SIMVASTATIN 20 MILLIGRAM(S): 20 TABLET, FILM COATED ORAL at 22:49

## 2018-11-29 NOTE — ED ADULT TRIAGE NOTE - CHIEF COMPLAINT QUOTE
ref in from Dr. Jones  for eval a productive cough, shortness of breath x a few weeks. and for bilateral lower leg swelling, doppler scan. denies chest pain. chronic home oxygen

## 2018-11-29 NOTE — ED STATDOCS - PROGRESS NOTE DETAILS
71 y/o M pt on home O2 with PMHx COPD, HTN, lung CA (on chemo, due for chemo tomorrow) presents to the ED c/o difficulty breathing.  He notes coughing with phlegm.  Pt saw his pulmonologist for these symptoms and was told to come into the ED for CT angio and US to r/o DVT.  Pt had his flu shot this year.  No EtOH intake.  Denies fever, chills, N/V.  No further acute complaints at this time.  Oncologist: Dr. Collazo, Pulmonologist: Dr. Jones  Exam: diminished breath sounds b/l, no acute distress, no respiratory distress  Pt will be sent to the Main ED for further treatment and evaluation. Initial orders placed.

## 2018-11-29 NOTE — ED ADULT NURSE NOTE - NSIMPLEMENTINTERV_GEN_ALL_ED
Implemented All Universal Safety Interventions:  [place] to call system. Call bell, personal items and telephone within reach. Instruct patient to call for assistance. Room bathroom lighting operational. Non-slip footwear when patient is off stretcher. Physically safe environment: no spills, clutter or unnecessary equipment. Stretcher in lowest position, wheels locked, appropriate side rails in place.

## 2018-11-29 NOTE — H&P ADULT - ASSESSMENT
72 year old male with PMH COPD on home O2 prn, Lung Ca s/p RL lobectomy and currently on chemo (last tx 2 weeks ago), HTN, HLD presents with 1 month fo progressively worsening SOB and cough being admitted for COPD exacerbation.    Admit to resident service under Dr. Stock  Activity: Ambulate as tolerated  Diet: Dash diet      #COPD Exacerbation  no inciting event  Fair air movement, mild expiratory wheexing  CXR  Albuterol PRN q2hrs  Duoneb q4h  Salumedrol 40mg IV q12 hrs  Levaquin 750mg IVPB daily  Pantoprazole 40mg PO daily  Pending Pulm consult    #Hypertension  continue home medication  Atenolol 50mg PO daily    #Hypercholesterolemia  continue home medication  simvastatin 20mg daily.    #Preventive Measure  Lovenox 40mg QD 72 year old male with PMH COPD on home O2 prn, Lung Ca s/p RL lobectomy and currently on chemo (last tx 2 weeks ago), HTN, HLD presents with 1 month fo progressively worsening SOB and cough being admitted for COPD exacerbation.    Admit to resident service under Dr. Stock  Activity: Ambulate as tolerated  Diet: Dash diet      #COPD Exacerbation  no inciting event  Fair air movement, mild expiratory wheezing  CXR- Mid right lower lung field fiduciary markers projecting over a 1.6 cm   nodular opacity  Spiral CT- Negative for PE, R middle lobe nodule  Doppler US- no dvt  Albuterol PRN q2hrs  Duoneb q4h  Salumedrol 40mg IV q12 hrs  Levaquin 750mg IVPB daily  Pantoprazole 40mg PO daily  Sputum culture, Legionalla Ag, and RVP pending  Pending Pulm consult    # Lung CA    S/p RL lobectomy and currently on chemo (last tx 2 weeks ago)  CT Angio: increased size of right middle lobe nodule in association with radiation   fiducial marker. Increased surrounding airspace opacity. Continued   attention on follow-up imaging is needed to differentiate between   residual tumor and posttreatment change.  Right MediPort       #Hypertension  continue home medication  Atenolol 50mg PO daily    #Hypercholesterolemia  continue home medication  simvastatin 20mg daily.    #Preventive Measure  Lovenox 40mg QD 72 year old male with PMH COPD on home O2 prn, Lung Ca s/p RL lobectomy and currently on chemo (last tx 2 weeks ago), HTN, HLD presents with 1 month of progressively worsening SOB and cough being admitted for COPD exacerbation.    Admit to resident service under Dr. Stock  Activity: Ambulate as tolerated  Diet: Dash diet      #COPD Exacerbation  no inciting event  Fair air movement, mild expiratory wheezing  CXR- Mid right lower lung field fiduciary markers projecting over a 1.6 cm   nodular opacity  Spiral CT- Negative for PE, R middle lobe nodule  Doppler US- no dvt  Albuterol PRN q2hrs  Duoneb q4h  Salumedrol 40mg IV q12 hrs  Levaquin 750mg IVPB daily  Pantoprazole 40mg PO daily  Sputum culture, Legionalla Ag, and RVP pending  Pending Pulm consult    # Lung CA  S/p RL lobectomy and currently on chemo (last tx 2 weeks ago)  CT Angio: increased size of right middle lobe nodule in association with radiation   fiducial marker. Increased surrounding airspace opacity. Continued   attention on follow-up imaging is needed to differentiate between   residual tumor and posttreatment change.  Right MediPort.     #Hypertension  continue home medication  Atenolol 50mg PO daily    #Hypercholesterolemia  continue home medication  simvastatin 20mg daily.    #Gout  continue with home medication  allopurinol 300mg daily    #Preventive Measure  Lovenox 40mg QD 72 year old male with PMH COPD on home O2 prn, Lung Ca s/p RL lobectomy and currently on chemo (last tx 2 weeks ago), HTN, HLD presents with 1 month of progressively worsening SOB and cough being admitted for COPD exacerbation.    Admit to resident service under Dr. Stock  Activity: Ambulate as tolerated  Diet: Dash diet      #COPD Exacerbation  no inciting event  Fair air movement, mild expiratory wheezing  CXR- Mid right lower lung field fiduciary markers projecting over a 1.6 cm   nodular opacity  Spiral CT- Negative for PE, R middle lobe nodule  Doppler US- no dvt  Albuterol PRN q2hrs  Duoneb q4h  Salumedrol 40mg IV q12 hrs  s/p Levaquin 750mg IVPB once  c/w Zithromax 250mg x4 days  Pantoprazole 40mg PO daily  Sputum culture, Legionalla Ag, and RVP pending  Pending Pulm consult    # Lung CA  S/p RL lobectomy and currently on chemo (last tx 2 weeks ago)  CT Angio: increased size of right middle lobe nodule in association with radiation   fiducial marker. Increased surrounding airspace opacity. Continued   attention on follow-up imaging is needed to differentiate between   residual tumor and posttreatment change.  Right MediPort.     #Hypertension  continue home medication  Atenolol 50mg PO daily    #Hypercholesterolemia  continue home medication  simvastatin 20mg daily.    #Gout  continue with home medication  allopurinol 300mg daily    #Preventive Measure  Lovenox 40mg QD 72 year old male with PMH COPD on home O2 prn, Lung Ca s/p RL lobectomy and currently on chemo (last tx 2 weeks ago), HTN, HLD presents with 1 month of progressively worsening SOB and cough being admitted for COPD exacerbation.    Admit to resident service under Dr. Stock  Activity: Ambulate as tolerated  Diet: Dash diet      #COPD Exacerbation  no inciting event  Fair air movement, mild expiratory wheezing  CXR- Mid right lower lung field fiduciary markers projecting over a 1.6 cm   nodular opacity  Spiral CT- Negative for PE, R middle lobe nodule  Doppler US- no dvt  Albuterol PRN q2hrs  Duoneb q4h  Salumedrol 40mg IV q12 hrs  s/p Levaquin 750mg IVPB once  c/w Zithromax 250mg x4 days  Pantoprazole 40mg PO daily  Sputum culture, Legionalla Ag, and RVP pending  Pending Pulm consult    # Lung CA  S/p RL lobectomy and currently on chemo (last tx 2 weeks ago)  CT Angio: increased size of right middle lobe nodule in association with radiation   fiducial marker. Increased surrounding airspace opacity. Continued   attention on follow-up imaging is needed to differentiate between   residual tumor and posttreatment change.  Right MediPort.     #Hypertension  continue home medication  Atenolol 50mg PO daily  c/w Lasix 40mg PO daily     #Hypercholesterolemia  continue home medication  simvastatin 20mg daily.    #Gout  continue with home medication  allopurinol 300mg daily    #Preventive Measure  Lovenox 40mg QD 72 year old male with PMH COPD on home O2 prn, Lung Ca s/p RL lobectomy and currently on chemo (last tx 2 weeks ago), HTN, HLD presents with 1 month of progressively worsening SOB and cough being admitted for COPD exacerbation.    Admit to resident service under Dr. Stock  Activity: Ambulate as tolerated  Diet: Dash diet      #COPD Exacerbation  no inciting event  Fair air movement, mild expiratory wheezing  CXR- Mid right lower lung field fiduciary markers projecting over a 1.6 cm   nodular opacity  Spiral CT- Negative for PE, R middle lobe nodule  Doppler US- no dvt  Albuterol PRN q2hrs  Duoneb q4h  Salumedrol 40mg IV q12 hrs  s/p Levaquin 750mg IVPB once  c/w Zithromax 250mg x4 days  Pantoprazole 40mg PO daily  Sputum culture, Legionalla Ag, and RVP pending  Pending Pulm consult    # Lung CA  S/p RL lobectomy and currently on chemo (last tx 2 weeks ago)  CT Angio: increased size of right middle lobe nodule in association with radiation   fiducial marker. Increased surrounding airspace opacity. Continued   attention on follow-up imaging is needed to differentiate between   residual tumor and posttreatment change.  Right MediPort.     #Hypertension  continue home medication  Atenolol 50mg PO daily    #Chronic Lymphedema  c/w Lasix 40mg PO daily     #Hypercholesterolemia  continue home medication  simvastatin 20mg daily.    #Gout  continue with home medication  allopurinol 300mg daily    #Preventive Measure  Lovenox 40mg QD

## 2018-11-29 NOTE — H&P ADULT - NSHPOUTPATIENTPROVIDERS_GEN_ALL_CORE
PMD- Dr. Lynne ([place])  Oncologist- Dr. Gemini Collazo ([place])  Pulmonologist- Dr. Griffith ([place]

## 2018-11-29 NOTE — H&P ADULT - FAMILY HISTORY
Family history of stroke     Sibling  Still living? No  Family history of acute myocardial infarction, Age at diagnosis: 51-60

## 2018-11-29 NOTE — H&P ADULT - NSHPSOCIALHISTORY_GEN_ALL_CORE
Pt quit smoking 20 years ago. Smoked 35 pack years.  No alcohol or drug use.   Pt is retired and lives with family. Pt quit smoking 20 years ago. Smoked 35 pack years.  No alcohol or drug use.   Pt is retired and lives with family.    FHx:  No family history of Lung cancer  MI in Father

## 2018-11-29 NOTE — ED CLERICAL - NS ED CLERK UNITS
Pharmacokinetic Consult to Pharmacist    Sami Garcia is a 82 y.o. female being treated for healthcare-associated pneumonia with Zosyn and vancomycin. @OHOR(50)@  @[facility]81)@  Lab Results   Component Value Date/Time    BUN 37 01/28/2017 05:24 AM    Creatinine 1.69 01/28/2017 05:24 AM    WBC 7.6 01/28/2017 05:24 AM    Procalcitonin 1.0 01/27/2017 12:57 PM      Estimated Creatinine Clearance: 15.2 mL/min (based on Cr of 1.69). CULTURES:  Blood - NG x 16 hours    Day 1 of vancomycin. Goal trough is 15 - 20. Vancomycin dose initiated at 1 gm IV x 1 dose. Due to poor renal function, pharmacy will order future vancomycin doses around random levels (when random level < 20). Next random level is scheduled for tomorrow at 0900. Will continue to follow patient.       Thank you,    Christian Terrell, PharmD [id number]/MON/ [id number]/ [id number]

## 2018-11-29 NOTE — ED ADULT NURSE REASSESSMENT NOTE - NS ED NURSE REASSESS COMMENT FT1
Report received from offgoing RN, charting as noted. Patient is A&Ox4, denies any pain or discomfort. Currently receiving nebulizer treatment.  Awaiting admit.

## 2018-11-29 NOTE — ED STATDOCS - NS_ ATTENDINGSCRIBEDETAILS _ED_A_ED_FT
I, Derrick Moore, performed the initial face to face bedside interview with this patient regarding history of present illness, review of symptoms and relevant past medical, social and family history.  I completed an independent physical examination.    The history, relevant review of systems, past medical and surgical history, medical decision making, and physical examination was documented by the scribe in my presence and I attest to the accuracy of the documentation.

## 2018-11-29 NOTE — ED PROVIDER NOTE - OBJECTIVE STATEMENT
72 year old male with 72 year old male with PMH COPD on home O2 prn (Pennie Jones), Lung Ca s/p RL lobectomy (Dr. Serrano) and currently on chemo last tx 2 weeks ago presents with 1 month fo progressive SOB. Pt states that his Sx are constant, worse with even minimal exertion and he is now using his home O2 around the clock, which is new. He Reports associated productive cough and LE edema, but denies chest pain, fever, chills, abd pain, hemoptysis

## 2018-11-29 NOTE — ED PROVIDER NOTE - CARE PLAN
Principal Discharge DX:	COPD exacerbation  Secondary Diagnosis:	HCAP (healthcare-associated pneumonia)

## 2018-11-29 NOTE — H&P ADULT - NSHPLABSRESULTS_GEN_ALL_CORE
13.5   6.6   )-----------( 165      ( 29 Nov 2018 15:22 )             42.1 13.5   6.6   )-----------( 165      ( 29 Nov 2018 15:22 )             42.1     11-29    143  |  105  |  14.0  ----------------------------<  97  4.6   |  27.0  |  0.90    Ca    9.1      29 Nov 2018 15:22    TPro  6.3<L>  /  Alb  4.1  /  TBili  0.8  /  DBili  x   /  AST  27  /  ALT  29  /  AlkPhos  74  11-29 13.5   6.6   )-----------( 165      ( 29 Nov 2018 15:22 )             42.1     11-29    143  |  105  |  14.0  ----------------------------<  97  4.6   |  27.0  |  0.90    Ca    9.1      29 Nov 2018 15:22    TPro  6.3<L>  /  Alb  4.1  /  TBili  0.8  /  DBili  x   /  AST  27  /  ALT  29  /  AlkPhos  74  11-29    EKG NSR, HR 70, no ST-T changes, QTc 453    Spiral CT: increased size of right middle lobe nodule in association with radiation   fiducial marker. Increased surrounding airspace opacity. Continued   attention on follow-up imaging is needed to differentiate between   residual tumor and posttreatment change.    CXR- Mid right lower lung field fiduciary markers projecting over a 1.6 cm   nodular opacity    Doppler US- no dvt 13.5   6.6   )-----------( 165      ( 29 Nov 2018 15:22 )             42.1     11-29    143  |  105  |  14.0  ----------------------------<  97  4.6   |  27.0  |  0.90    Ca    9.1      29 Nov 2018 15:22    TPro  6.3<L>  /  Alb  4.1  /  TBili  0.8  /  DBili  x   /  AST  27  /  ALT  29  /  AlkPhos  74  11-29    EKG NSR, HR 70, no ST-T changes, QTc 453    CT Angio Chest: increased size of right middle lobe nodule in association with radiation   fiducial marker. Increased surrounding airspace opacity. Continued   attention on follow-up imaging is needed to differentiate between   residual tumor and posttreatment change.    CXR- Mid right lower lung field fiduciary markers projecting over a 1.6 cm   nodular opacity    Doppler US- no dvt

## 2018-11-29 NOTE — H&P ADULT - HISTORY OF PRESENT ILLNESS
72 year old male with PMH COPD on home O2 prn, Lung Ca s/p RL lobectomy and currently on chemo (last tx 2 weeks ago) presents with 1 month fo progressively worsening SOB and cough. Pt reports difficulty breathng over th e past month without improvemnet and has had to depend on his home O2 machine constantly. Pt normally uses home oxygen as needed and has O2 for sleeping. Pt states it has been worsening for the past week where he loses his breath and cant breathe with minimal exertion such as walking or performing simple tasks. This has caused him to get tired quicker. He has tried to use his inhaler but had little relief. He went to see his oncologist who referred to his pulmonologist. Pt saw his pulmonologist today who said he didn't look well and told him to come to the ER for a possible CT chest and doppler. He reports a productive cough, with white mucous production that is nonbloody. He denies any fever, sick contact or fevers recently.  denies chest pain, fever, chills, abd pain, hemoptysis 72 year old male with PMH COPD on home O2 prn, Lung Ca s/p RL lobectomy and currently on chemo (last tx 2 weeks ago), HTN, and HLD  presents with 1 month of progressively worsening SOB and cough. Pt reports difficulty breathing over the past month without improvement and has had to depend on his home O2 machine constantly. Pt normally uses home oxygen as needed and has O2 for sleeping. Pt states it has been worsening for the past week where he loses his breath and can't breathe with minimal exertion such as walking or performing simple tasks. This has caused him to get tired quicker. He has tried to use his inhaler but had little relief. He went to see his oncologist who referred to his pulmonologist. Pt saw his pulmonologist today who said he didn't look well and told him to come to the ER for a possible CT chest and doppler. He reports a productive cough, with white mucous production that is nonbloody. He denies any fever, sick contact or fevers recently.  denies chest pain, fever, chills, abd pain, hemoptysis 72 year old male with PMH COPD on home O2 prn, Lung Ca s/p RL lobectomy and currently on chemo (last tx 2 weeks ago), HTN, and HLD  presents with 1 month of progressively worsening SOB and cough. Pt reports difficulty breathing over the past month without improvement and has had to depend on his home O2 machine constantly. Pt normally uses home oxygen (at 2L) as needed and has O2 for sleeping (at 2.5) . Pt states it has been worsening for the past week where he loses his breath and can't breathe with minimal exertion such as walking or performing simple tasks. This has caused him to get tired quicker. He has tried to use his inhaler but had little relief. He went to see his oncologist who referred to his pulmonologist. Pt saw his pulmonologist today who said he didn't look well and told him to come to the ER for a possible CT chest and doppler. He reports a productive cough, with white mucous production that is nonbloody. He denies any fever, sick contact or fevers recently.  denies chest pain, fever, chills, abd pain, hemoptysis 72 year old male with PMH COPD on home O2 prn, Lung Ca s/p RL lobectomy and currently on chemo (last tx 2 weeks ago), HTN, and HLD  presents with 1 month of progressively worsening SOB and cough. Pt reports difficulty breathing over the past month without improvement and has had to depend on his home O2 machine constantly. Pt normally uses home oxygen (at 2L) as needed and has O2 for sleeping (at 2.5) . Pt states it has been worsening for the past week where he loses his breath and can't breathe with minimal exertion such as walking or performing simple tasks. This has caused him to get tired quicker. He has tried to use his inhaler but had little relief. He went to see his oncologist who referred to his pulmonologist. Pt saw his pulmonologist today who said he didn't look well and told him to come to the ER for a possible CT chest and doppler. He reports a productive cough, with white mucous production that is nonbloody. He denies any fever or sick contact.  Denies chest pain, fever, chills, abd pain, hemoptysis. 72 year old male with PMH COPD on home O2 prn, Lung Ca s/p RL lobectomy (2016), 3 doses of radiation(in 2017) and currently on chemo (last tx 2 weeks ago), HTN, and HLD  presents with 1 month of progressively worsening SOB and cough. Pt reports difficulty breathing over the past month without improvement and has had to depend on his home O2 machine constantly. Pt normally uses home oxygen (at 2L) as needed and has O2 for sleeping (at 2.5) . Pt states it has been worsening for the past week where he loses his breath and can't breathe with minimal exertion such as walking or performing simple tasks. This has caused him to get tired quicker. He has tried to use his inhaler but had little relief. He went to see his oncologist who referred to his pulmonologist. Pt saw his pulmonologist today who said he didn't look well and told him to come to the ER for a possible CT chest and doppler. He reports a productive cough, with white mucous production that is nonbloody. He denies any fever or sick contact.  Denies chest pain, fever, chills, abd pain, hemoptysis. 72 year old male with PMH COPD on home O2 prn, Lung Ca s/p RL lobectomy (2016), 3 doses of radiation(in 2017) and currently on chemo (last tx 2 weeks ago), HTN, and HLD  presents with 1 month of progressively worsening SOB and cough. Pt reports difficulty breathing over the past month without improvement and has had to depend on his home O2 machine constantly. Pt normally uses oxygen during the day (at 2L) as needed and has O2 for sleeping (at 2.5L) . Pt states it has been worsening for the past week where he loses his breath and can't breathe with minimal exertion such as walking or performing simple tasks. This has caused him to get tired quicker. He has tried to use his inhaler but had little relief. He went to see his oncologist who referred to his pulmonologist. Pt saw his pulmonologist today who said he didn't look well and told him to come to the ER for a possible CT chest and doppler. He reports a productive cough, with white mucous production that is nonbloody. He denies any fever, sick contact or recent travel.  Denies chest pain, fever, chills, abd pain, hemoptysis.

## 2018-11-29 NOTE — H&P ADULT - NSHPPHYSICALEXAM_GEN_ALL_CORE
PHYSICAL EXAM:  GENERAL: NAD, well-developed, Obese male on nasal cannula O2  HEAD:  Atraumatic, Normocephalic  EYES: EOMI, PERRLA, conjunctiva and sclera clear  NECK: Supple, No JVD, Right MediPort   RESP: Fair air entry; mild expiratory wheezing  CARDIO: Regular rate and rhythm; No murmurs, rubs, or gallops  ABDOMEN: Soft, Nontender, Obese; Bowel sounds present  EXTREMITIES:, No clubbing, cyanosis, or edema  PSYCH: AAOx3, normal mood.  NEUROLOGY: no focal neurological deficits, CNs grossly intact  SKIN: No rashes or lesions PHYSICAL EXAM:  GENERAL: NAD, well-developed, Obese male on nasal cannula 3L O2  HEAD:  Atraumatic, Normocephalic  EYES: EOMI, PERRLA, conjunctiva and sclera clear  NECK: Supple, No JVD, Right MediPort   RESP: Fair air entry; mild expiratory wheezing  CARDIO: Regular rate and rhythm; No murmurs, rubs, or gallops  ABDOMEN: Soft, Nontender, Obese; Bowel sounds present  EXTREMITIES:, No clubbing, cyanosis, or edema  PSYCH: AAOx3, normal mood.  NEUROLOGY: no focal neurological deficits, CNs grossly intact  SKIN: No rashes or lesions PHYSICAL EXAM:  GENERAL: NAD, well-developed, Obese male on nasal cannula 3L O2  HEAD:  Atraumatic, Normocephalic  EYES: EOMI, PERRLA, conjunctiva and sclera clear  NECK: Supple, No JVD, Right MediPort   RESP: Fair air entry; mild expiratory wheezing  CARDIO: Regular rate and rhythm; No murmurs, rubs, or gallops  ABDOMEN: Soft, Nontender, Obese; Bowel sounds present  EXTREMITIES:, No clubbing, cyanosis, or edema. 2+BL pitting edema.  PSYCH: AAOx3, normal mood.  NEUROLOGY: no focal neurological deficits, CNs grossly intact  SKIN: No rashes or lesions

## 2018-11-29 NOTE — ED ADULT NURSE NOTE - OBJECTIVE STATEMENT
assumed pt care at 1500. pt a&o x 4. pt states he has had increased OSULLIVAN with a productive cough. 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 Over the past week noticed that he could not walk or do anything without the oxygen on. Pt last chemo was 2 weeks ago. R CW port not accessed. Pt nonlabored breathing at rest. Denies any chest pain. No respiratory distress or diaphoresis noted. B/L LE edema present. Pt denies dizziness or vision changes. Will continue to monitor.

## 2018-11-30 ENCOUNTER — TRANSCRIPTION ENCOUNTER (OUTPATIENT)
Age: 73
End: 2018-11-30

## 2018-11-30 LAB
ANION GAP SERPL CALC-SCNC: 16 MMOL/L — SIGNIFICANT CHANGE UP (ref 5–17)
ANISOCYTOSIS BLD QL: SLIGHT — SIGNIFICANT CHANGE UP
BUN SERPL-MCNC: 15 MG/DL — SIGNIFICANT CHANGE UP (ref 8–20)
CALCIUM SERPL-MCNC: 9.4 MG/DL — SIGNIFICANT CHANGE UP (ref 8.6–10.2)
CHLORIDE SERPL-SCNC: 102 MMOL/L — SIGNIFICANT CHANGE UP (ref 98–107)
CO2 SERPL-SCNC: 22 MMOL/L — SIGNIFICANT CHANGE UP (ref 22–29)
CREAT SERPL-MCNC: 0.9 MG/DL — SIGNIFICANT CHANGE UP (ref 0.5–1.3)
GLUCOSE SERPL-MCNC: 164 MG/DL — HIGH (ref 70–115)
HCT VFR BLD CALC: 42.6 % — SIGNIFICANT CHANGE UP (ref 42–52)
HGB BLD-MCNC: 13.8 G/DL — LOW (ref 14–18)
LYMPHOCYTES # BLD AUTO: 9 % — LOW (ref 20–55)
MACROCYTES BLD QL: SLIGHT — SIGNIFICANT CHANGE UP
MAGNESIUM SERPL-MCNC: 2 MG/DL — SIGNIFICANT CHANGE UP (ref 1.6–2.6)
MCHC RBC-ENTMCNC: 28.9 PG — SIGNIFICANT CHANGE UP (ref 27–31)
MCHC RBC-ENTMCNC: 32.4 G/DL — SIGNIFICANT CHANGE UP (ref 32–36)
MCV RBC AUTO: 89.1 FL — SIGNIFICANT CHANGE UP (ref 80–94)
MICROCYTES BLD QL: SLIGHT — SIGNIFICANT CHANGE UP
MONOCYTES NFR BLD AUTO: 2 % — LOW (ref 3–10)
NEUTROPHILS NFR BLD AUTO: 88 % — HIGH (ref 37–73)
PHOSPHATE SERPL-MCNC: 3 MG/DL — SIGNIFICANT CHANGE UP (ref 2.4–4.7)
PLAT MORPH BLD: NORMAL — SIGNIFICANT CHANGE UP
PLATELET # BLD AUTO: 161 K/UL — SIGNIFICANT CHANGE UP (ref 150–400)
POLYCHROMASIA BLD QL SMEAR: SLIGHT — SIGNIFICANT CHANGE UP
POTASSIUM SERPL-MCNC: 3.9 MMOL/L — SIGNIFICANT CHANGE UP (ref 3.5–5.3)
POTASSIUM SERPL-SCNC: 3.9 MMOL/L — SIGNIFICANT CHANGE UP (ref 3.5–5.3)
RAPID RVP RESULT: SIGNIFICANT CHANGE UP
RBC # BLD: 4.78 M/UL — SIGNIFICANT CHANGE UP (ref 4.6–6.2)
RBC # FLD: 18.2 % — HIGH (ref 11–15.6)
RBC BLD AUTO: ABNORMAL
SODIUM SERPL-SCNC: 140 MMOL/L — SIGNIFICANT CHANGE UP (ref 135–145)
VARIANT LYMPHS # BLD: 1 % — SIGNIFICANT CHANGE UP (ref 0–6)
WBC # BLD: 3.2 K/UL — LOW (ref 4.8–10.8)
WBC # FLD AUTO: 3.2 K/UL — LOW (ref 4.8–10.8)

## 2018-11-30 PROCEDURE — 99232 SBSQ HOSP IP/OBS MODERATE 35: CPT | Mod: GC

## 2018-11-30 PROCEDURE — 99223 1ST HOSP IP/OBS HIGH 75: CPT

## 2018-11-30 RX ORDER — CHLORHEXIDINE GLUCONATE 213 G/1000ML
1 SOLUTION TOPICAL ONCE
Qty: 0 | Refills: 0 | Status: COMPLETED | OUTPATIENT
Start: 2018-11-30 | End: 2018-12-02

## 2018-11-30 RX ORDER — INFLUENZA VIRUS VACCINE 15; 15; 15; 15 UG/.5ML; UG/.5ML; UG/.5ML; UG/.5ML
0.5 SUSPENSION INTRAMUSCULAR ONCE
Qty: 0 | Refills: 0 | Status: COMPLETED | OUTPATIENT
Start: 2018-11-30 | End: 2018-11-30

## 2018-11-30 RX ADMIN — Medication 40 MILLIGRAM(S): at 06:35

## 2018-11-30 RX ADMIN — Medication 40 MILLIGRAM(S): at 17:16

## 2018-11-30 RX ADMIN — Medication 40 MILLIGRAM(S): at 23:51

## 2018-11-30 RX ADMIN — Medication 50 MILLIGRAM(S): at 11:52

## 2018-11-30 RX ADMIN — Medication 3 MILLILITER(S): at 03:38

## 2018-11-30 RX ADMIN — Medication 300 MILLIGRAM(S): at 11:52

## 2018-11-30 RX ADMIN — Medication 200 MILLIGRAM(S): at 23:55

## 2018-11-30 RX ADMIN — Medication 40 MILLIGRAM(S): at 11:52

## 2018-11-30 RX ADMIN — AZITHROMYCIN 250 MILLIGRAM(S): 500 TABLET, FILM COATED ORAL at 11:52

## 2018-11-30 RX ADMIN — ENOXAPARIN SODIUM 40 MILLIGRAM(S): 100 INJECTION SUBCUTANEOUS at 06:35

## 2018-11-30 RX ADMIN — Medication 81 MILLIGRAM(S): at 11:52

## 2018-11-30 RX ADMIN — SIMVASTATIN 20 MILLIGRAM(S): 20 TABLET, FILM COATED ORAL at 21:28

## 2018-11-30 RX ADMIN — Medication 40 MILLIGRAM(S): at 06:34

## 2018-11-30 RX ADMIN — Medication 3 MILLILITER(S): at 11:34

## 2018-11-30 RX ADMIN — Medication 3 MILLILITER(S): at 15:42

## 2018-11-30 RX ADMIN — Medication 3 MILLILITER(S): at 09:17

## 2018-11-30 RX ADMIN — Medication 1000 UNIT(S): at 11:52

## 2018-11-30 RX ADMIN — Medication 50 MILLIGRAM(S): at 06:35

## 2018-11-30 RX ADMIN — Medication 50 MILLIGRAM(S): at 21:30

## 2018-11-30 RX ADMIN — Medication 3 MILLILITER(S): at 20:52

## 2018-11-30 RX ADMIN — Medication 200 MILLIGRAM(S): at 11:53

## 2018-11-30 RX ADMIN — ATENOLOL 50 MILLIGRAM(S): 25 TABLET ORAL at 06:35

## 2018-11-30 NOTE — DISCHARGE NOTE ADULT - OTHER SIGNIFICANT FINDINGS
< from: CT Angio Chest w/ IV Cont (11.29.18 @ 17:10) >  IMPRESSION:     No pulmonary embolism.    No acute pleural or parenchymal abnormality.    Increased size of right middle lobe nodule in association with radiation   fiducial marker. Increased surrounding airspace opacity. Continued   attention on follow-up imaging is needed to differentiate between   residual tumor and posttreatment change.    < end of copied text >    < from: Xray Chest 1 View AP/PA. (11.29.18 @ 16:18) >  FINDINGS:    There is interval placement of right MediPort with the tip projecting   over the distal superior vena cava. No pneumothorax.    Redemonstration of 2 tiny linear radiopaque densities projecting overthe   mid right lower lung field, likely representing fiduciary markers. These   markers project over a 1.6 x 1.4 cm nodular opacity, corresponding to a   nodule on PET/CT of 9/26/18 which demonstrated FDG activity. There is   chronic curvilinear opacities within the bilateral lower lung field   suggesting mild scarring and/or atelectasis. No vascular congestion or   pleural effusion. Mild right apical pleural/parenchymal scarring is again   noted.    The cardiac silhouette size is within normal limits.    The visualized osseous structures are within normal limits for age.    IMPRESSION:    Interval placement of right MediPort with tip projecting over the distal   SVC. No pneumothorax.    Mid right lower lung field fiduciary markers projecting over a 1.6 cm   nodular opacity, corresponding to FDG avid nodule on PET/CT of 9/26/18.    < end of copied text > < from: CT Angio Chest w/ IV Cont (11.29.18 @ 17:10) >  IMPRESSION:     No pulmonary embolism.    No acute pleural or parenchymal abnormality.    Increased size of right middle lobe nodule in association with radiation   fiducial marker. Increased surrounding airspace opacity. Continued   attention on follow-up imaging is needed to differentiate between   residual tumor and posttreatment change.    < end of copied text >    < from: Xray Chest 1 View AP/PA. (11.29.18 @ 16:18) >  FINDINGS:    There is interval placement of right MediPort with the tip projecting   over the distal superior vena cava. No pneumothorax.    Redemonstration of 2 tiny linear radiopaque densities projecting overthe   mid right lower lung field, likely representing fiduciary markers. These   markers project over a 1.6 x 1.4 cm nodular opacity, corresponding to a   nodule on PET/CT of 9/26/18 which demonstrated FDG activity. There is   chronic curvilinear opacities within the bilateral lower lung field   suggesting mild scarring and/or atelectasis. No vascular congestion or   pleural effusion. Mild right apical pleural/parenchymal scarring is again   noted.    The cardiac silhouette size is within normal limits.    The visualized osseous structures are within normal limits for age.    IMPRESSION:    Interval placement of right MediPort with tip projecting over the distal   SVC. No pneumothorax.    Mid right lower lung field fiduciary markers projecting over a 1.6 cm   nodular opacity, corresponding to FDG avid nodule on PET/CT of 9/26/18.    < end of copied text >                            12.5   9.5   )-----------( 203      ( 02 Dec 2018 07:00 )             39.8     12-02    140  |  104  |  29.0<H>  ----------------------------<  84  4.3   |  26.0  |  0.86    Ca    8.7      02 Dec 2018 07:00  Phos  3.4     12-01  Mg     2.4     12-01    TPro  5.5<L>  /  Alb  3.4  /  TBili  0.5  /  DBili  x   /  AST  24  /  ALT  22  /  AlkPhos  66  12-02    < from: US Duplex Venous Lower Ext Complete, Bilateral (11.29.18 @ 17:55) >    IMPRESSION:     No evidence of bilateral lower extremity deep venous thrombosis.  Right inguinal nonvascular hypoechoic structure measures 1.2 x 0.9 x 1.1   cm. This may represent an abnormal lymph node. Outpatient Follow-up or CT   is recommended.    < end of copied text >

## 2018-11-30 NOTE — DISCHARGE NOTE ADULT - MEDICATION SUMMARY - MEDICATIONS TO TAKE
I will START or STAY ON the medications listed below when I get home from the hospital:    predniSONE 10 mg oral tablet  -- 4 tab once a day x 2 days  3 tab once a day x 2 days  2 tab once a day x 2 days  1 tab once a day x 2 days  Until complete  -- It is very important that you take or use this exactly as directed.  Do not skip doses or discontinue unless directed by your doctor.  Obtain medical advice before taking any non-prescription drugs as some may affect the action of this medication.  Take with food or milk.    -- Indication: For Copd exacerbation     Percocet 5/325 oral tablet  -- 1 tab(s) by mouth every 6 hours, As Needed  -- Indication: For pain    aspirin 81 mg oral tablet  -- 1 tab(s) by mouth once a day  -- Indication: For prophylaxis    Lyrica 50 mg oral capsule  -- 50 milligram(s) by mouth 3 times a day  -- Indication: For pain    allopurinol 300 mg oral tablet  -- 1 tab(s) by mouth once a day  -- Indication: For Gout    simvastatin 20 mg oral tablet  -- 1 tab(s) by mouth once a day (at bedtime)  -- Indication: For hyperlipidemia    atenolol 50 mg oral tablet  -- 1 tab(s) by mouth once a day  -- Indication: For hypertension    Advair Diskus 250 mcg-50 mcg inhalation powder  -- 1 puff(s) inhaled 2 times a day  -- Indication: For Copd    albuterol 90 mcg/inh inhalation aerosol  --  inhaled , As Needed  -- Indication: For Copd    albuterol-ipratropium 2.5 mg-0.5 mg/3 mL inhalation solution  -- 3 milliliter(s) inhaled every 4 hours while awake, As Needed, Shortness of Breath and/or Wheezing  -- Indication: For Chronic obstructive pulmonary disease with acute exacerbation    furosemide 40 mg oral tablet  -- 1 tab(s) by mouth once a day  -- Indication: For Leg swelling     pantoprazole 40 mg oral delayed release tablet  -- 1 tab(s) by mouth once a day  -- Indication: For prophylaxis     HYDROcodone-homatropine 5 mg-1.5 mg/5 mL oral syrup  -- 5 milliliter(s) by mouth once a day (at bedtime), As Needed -for cough MDD:10 mL  -- Caution federal law prohibits the transfer of this drug to any person other  than the person for whom it was prescribed.  May cause drowsiness.  Alcohol may intensify this effect.  Use care when operating dangerous machinery.  Obtain medical advice before taking any non-prescription drugs as some may affect the action of this medication.    -- Indication: For Cough    Vitamin D3 1000 intl units oral tablet  -- 1 tab(s) by mouth once a day  -- Indication: For vitamin supplement

## 2018-11-30 NOTE — PROGRESS NOTE ADULT - SUBJECTIVE AND OBJECTIVE BOX
Patient is a 72y old  Male who presents with a chief complaint of Shortness of breath and cough. (30 Nov 2018 09:48)    INTERVAL/OVERNIGHT EVENTS  Patient reports improvement of his shortness of breath, but still having dry cough all night.  No further complaints.    ROS: No chest pain, palpitation, nausea, vomiting or other symptoms.    Vital Signs Last 24 Hrs  T(C): 36.7 (30 Nov 2018 11:46), Max: 36.9 (29 Nov 2018 21:40)  T(F): 98 (30 Nov 2018 11:46), Max: 98.4 (29 Nov 2018 21:40)  HR: 86 (30 Nov 2018 11:46) (56 - 86)  BP: 109/51 (30 Nov 2018 11:46) (109/51 - 149/85)  BP(mean): --  RR: 20 (30 Nov 2018 11:46) (18 - 20)  SpO2: 94% (30 Nov 2018 11:46) (94% - 96%)    GENERAL: No acute distress, well-developed, obese male on supplemental O2.  HEENT: Normocephalic, atraumatic; EOMI, PERRLA, moist mucous membranes.  NECK: Supple, No JVD, Right MediPort   RESP: Mildly tachypneic, no use of accessory muscles, no retractions, diffuse expiratory wheezing noted bilaterally  CARDIO: Regular rate and rhythm; No murmurs, rubs, or gallops  ABDOMEN: Soft, Nontender, Obese; Bowel sounds present  EXTREMITIES: No clubbing, cyanosis, or edema. 2+BL pitting edema.  NEUROLOGY: Alert and oriented x3, no focal neurological deficits.  SKIN: No rashes or lesions                          13.8   3.2   )-----------( 161      ( 30 Nov 2018 04:41 )             42.6     11-30    140  |  102  |  15.0  ----------------------------<  164<H>  3.9   |  22.0  |  0.90    Ca    9.4      30 Nov 2018 04:41  Phos  3.0     11-30  Mg     2.0     11-30    TPro  6.3<L>  /  Alb  4.1  /  TBili  0.8  /  DBili  x   /  AST  27  /  ALT  29  /  AlkPhos  74  11-29    Rapid Respiratory Viral Panel (11.30.18 @ 00:19)    Rapid RVP Result: NotDetec    MEDICATIONS  (STANDING):  ALBUTerol/ipratropium for Nebulization 3 milliLiter(s) Nebulizer every 4 hours  allopurinol 300 milliGRAM(s) Oral daily  aspirin  chewable 81 milliGRAM(s) Oral daily  ATENolol  Tablet 50 milliGRAM(s) Oral daily  azithromycin   Tablet 250 milliGRAM(s) Oral daily  cholecalciferol 1000 Unit(s) Oral daily  enoxaparin Injectable 40 milliGRAM(s) SubCutaneous every 24 hours  furosemide    Tablet 40 milliGRAM(s) Oral daily  influenza   Vaccine 0.5 milliLiter(s) IntraMuscular once  methylPREDNISolone sodium succinate Injectable 40 milliGRAM(s) IV Push every 6 hours  pregabalin 50 milliGRAM(s) Oral three times a day  simvastatin 20 milliGRAM(s) Oral at bedtime    MEDICATIONS  (PRN):  ALBUTerol    0.083% 2.5 milliGRAM(s) Nebulizer every 2 hours PRN COPD  guaiFENesin    Syrup 200 milliGRAM(s) Oral every 6 hours PRN Cough/congestion cc: sob     INTERVAL/OVERNIGHT EVENTS  Patient reports improvement of his shortness of breath, but still having dry cough all night. deneis any sputum production. denies fever or chills. denies orthopnea.     ROS: No chest pain, palpitation, nausea, vomiting or other symptoms.    Vital Signs Last 24 Hrs  T(C): 36.7 (30 Nov 2018 11:46), Max: 36.9 (29 Nov 2018 21:40)  T(F): 98 (30 Nov 2018 11:46), Max: 98.4 (29 Nov 2018 21:40)  HR: 86 (30 Nov 2018 11:46) (56 - 86)  BP: 109/51 (30 Nov 2018 11:46) (109/51 - 149/85)  BP(mean): --  RR: 20 (30 Nov 2018 11:46) (18 - 20)  SpO2: 94% (30 Nov 2018 11:46) (94% - 96%)    GENERAL: No acute distress, well-developed, obese male on supplemental O2.  HEENT: Normocephalic, atraumatic; EOMI, PERRLA, moist mucous membranes.  NECK: Supple, No JVD, Right MediPort   RESP: Mildly tachypneic, no use of accessory muscles, no retractions, diffuse expiratory wheezing noted bilaterally  CARDIO: Regular rate and rhythm; No murmurs, rubs, or gallops  ABDOMEN: Soft, Nontender, Obese; Bowel sounds present  EXTREMITIES: No clubbing, cyanosis, or edema. 2+BL pitting edema.  NEUROLOGY: Alert and oriented x3, no focal neurological deficits.  SKIN: No rashes or lesions                          13.8   3.2   )-----------( 161      ( 30 Nov 2018 04:41 )             42.6     11-30    140  |  102  |  15.0  ----------------------------<  164<H>  3.9   |  22.0  |  0.90    Ca    9.4      30 Nov 2018 04:41  Phos  3.0     11-30  Mg     2.0     11-30    TPro  6.3<L>  /  Alb  4.1  /  TBili  0.8  /  DBili  x   /  AST  27  /  ALT  29  /  AlkPhos  74  11-29    Rapid Respiratory Viral Panel (11.30.18 @ 00:19)    Rapid RVP Result: NotDetec    MEDICATIONS  (STANDING):  ALBUTerol/ipratropium for Nebulization 3 milliLiter(s) Nebulizer every 4 hours  allopurinol 300 milliGRAM(s) Oral daily  aspirin  chewable 81 milliGRAM(s) Oral daily  ATENolol  Tablet 50 milliGRAM(s) Oral daily  azithromycin   Tablet 250 milliGRAM(s) Oral daily  cholecalciferol 1000 Unit(s) Oral daily  enoxaparin Injectable 40 milliGRAM(s) SubCutaneous every 24 hours  furosemide    Tablet 40 milliGRAM(s) Oral daily  influenza   Vaccine 0.5 milliLiter(s) IntraMuscular once  methylPREDNISolone sodium succinate Injectable 40 milliGRAM(s) IV Push every 6 hours  pregabalin 50 milliGRAM(s) Oral three times a day  simvastatin 20 milliGRAM(s) Oral at bedtime    MEDICATIONS  (PRN):  ALBUTerol    0.083% 2.5 milliGRAM(s) Nebulizer every 2 hours PRN COPD  guaiFENesin    Syrup 200 milliGRAM(s) Oral every 6 hours PRN Cough/congestion

## 2018-11-30 NOTE — DISCHARGE NOTE ADULT - HOSPITAL COURSE
Patient is a 72 year old male with PMH COPD on home O2 prn, Lung Ca s/p RL lobectomy and currently on chemo (last tx 2 weeks ago), HTN, HLD presents with 1 month of progressively worsening SOB and cough being admitted for COPD exacerbation. Pt was started on IV antibiotics received 1 time dose of Azithromycin and IV steroids after clinical improvement and a couple episodes of diarrhea, antibiotics were d/c as per pulm and steroids were changed to PO. Pt states improvement but continues to have cough. Pt had a negative RVP and negative legionella antigen. Imaging with noted RLL nodular opacity that is larger with a negative PE ct chest and a confirmed RML nodule on ct chest. Pt will be sent home on a 8 day steroid taper. Pulmonology and oncology consults appreciated. Pt to f/u outpatient with Pulm and Oncology.     Pt noted to have edema of both legs, Rt leg > Lt left. Pt was restarted on home dose of lasix 40 mg PO with some improvement of edema. Pt stated that this is chronic and related to his gout which he takes the Lasix and allopurinol for. No acute flare ups of gout during hospital stay.     All electrolyte abnormalities were monitored carefully and repleted as necessary during this hospitalization. At the time of discharge patient was hemodynamically stable and amenable to all terms of discharge. The patient has received verbal instructions from myself regarding discharge plans.     Length of Discharge: 39MIN Patient is a 72 year old male with PMH COPD on home O2 prn, Lung Ca s/p RL lobectomy and currently on chemo (last tx 2 weeks ago), HTN, HLD presents with 1 month of progressively worsening SOB and cough being admitted for COPD exacerbation. Pt was started on IV antibiotics received 1 time dose of Azithromycin and IV steroids after clinical improvement and a couple episodes of diarrhea, antibiotics were d/c as per pulm and steroids were changed to PO. Pt states improvement but continues to have cough. Pt had a negative RVP and negative legionella antigen. Imaging with noted RLL nodular opacity that is larger with a negative PE ct chest and a confirmed RML nodule on ct chest. Pt will be sent home on a 8 day steroid taper. Pulmonology and oncology consults appreciated. Pt to f/u outpatient with Pulm and Oncology.     Pt noted to have edema of both legs, Rt leg > Lt left. Pt was restarted on home dose of lasix 40 mg PO with some improvement of edema. Pt stated that this is chronic and related to his gout which he takes the Lasix and allopurinol for. No acute flare ups of gout during hospital stay. Duplex b/l LE was negative for clot.     Patient was transitioned to oral steroids, tolerated it well and was planned for d/c to home with his home O2 and a steroid taper. Daughter spoken to regarding plan.     All electrolyte abnormalities were monitored carefully and repleted as necessary during this hospitalization. At the time of discharge patient was hemodynamically stable and amenable to all terms of discharge. The patient has received verbal instructions from myself regarding discharge plans.     Total time coordinating this discharge was 40 minutes.

## 2018-11-30 NOTE — PATIENT PROFILE ADULT - NSPROPOAPRESSUREINJURY_GEN_A_NUR
Other (Free Text): Patient just started new birth control x 1 month, tricyclen family, encouraged to be patient with transition of hormones,  spot treatment with bpo recommended, samples retin a 0.08 to use to chin once weekly, continue maintenance rx
Note Text (......Xxx Chief Complaint.): This diagnosis correlates with the
Detail Level: Zone
no

## 2018-11-30 NOTE — CONSULT NOTE ADULT - ASSESSMENT
Imp:  Squamous cell lung carcinoma; post chemo with interval resolution of previous hypermetabolic portacaval LN and stable right lung nodule  Admitted with COPD exacerbation-management per Pulmonary; agree with steroids incase of element of pneumonitis.    Additional chemo as outpat.

## 2018-11-30 NOTE — DISCHARGE NOTE ADULT - CARE PLAN
Principal Discharge DX:	Chronic obstructive pulmonary disease with acute exacerbation  Goal:	Symptom prevention  Assessment and plan of treatment:	You have a history of COPD. In the hospital you were treated for an exacerbation of this condition. Continue using your home inhalers upon discharge. You are being sent home on a steroid taper. It is important that you take the steroids according to the instructions. A taper dose is one that starts at higher doses and slowly decreases the amount of steroids.   Follow-up with your primary care doctor within one week of discharge, as well as your lung and cancer doctors further monitoring of this condition  Secondary Diagnosis:	Essential hypertension  Goal:	SBP <140  Assessment and plan of treatment:	Follow a low sodium diet. Continue taking your blood pressure medications as prescribed. Follow up with your Primary Care Doctor to continue having your blood pressure checked on a continual basis.  Secondary Diagnosis:	Dyslipidemia  Goal:	LDL < 120  Assessment and plan of treatment:	Follow a low fat diet. Continue taking your cholesterol medications as prescribed. Follow up with your Primary Care Doctor to continue having your cholesterol levels checked on a continual basis.  Secondary Diagnosis:	Gout  Goal:	symptom prevention  Assessment and plan of treatment:	If you have been prescribed medication to control your gout, be sure to take it as prescribed. Avoid foods that are known to trigger gout flares such as: red meat/organ meat, shellfish, refined carbohydrates (ex. white bread, white rice, pasta, sugar), processed foods (ex. chips, snack foods, frozen dinners), sugary beverages, alcohol (no more that 1-2 drinks in a 24hr period).  Secondary Diagnosis:	Lung cancer  Goal:	Follow up  Assessment and plan of treatment:	- If you cough up blood, experience chest pain, shortness of breath, call your primary care physician or go to the ED immediately  - Take medication as prescribed  - Follow up with primary care physician and your oncologist/cancer doctor after discharge. Principal Discharge DX:	Chronic obstructive pulmonary disease with acute exacerbation  Goal:	Symptom prevention  Assessment and plan of treatment:	You were treated in the hospital with steroids by IV and tolerated oral steroids well   Continue to use your oxygen at home as you have been prior to admission   Please see your lung doctor on discharge (Dr. Taylor)  Please complete your prednisone taper and see your doctors before it finishes  Secondary Diagnosis:	Essential hypertension  Goal:	SBP <140  Assessment and plan of treatment:	Follow a low sodium diet. Continue taking your blood pressure medications as prescribed. Please check your Blood pressure at home and if the top number is always over 150 or if the bottom number is always over 100, please call your doctor  Secondary Diagnosis:	Dyslipidemia  Goal:	LDL < 120  Assessment and plan of treatment:	continue with you statin and follow a low cholesterol diet  Secondary Diagnosis:	Gout  Goal:	symptom prevention  Assessment and plan of treatment:	please continue to take your gout medications as you were prior to admission  Secondary Diagnosis:	Lung cancer  Goal:	Follow up  Assessment and plan of treatment:	Please follow up with your oncologist to continue with your planned chemotherapy on discharge

## 2018-11-30 NOTE — DISCHARGE NOTE ADULT - PATIENT PORTAL LINK FT
You can access the [facility] Patient Portal, offered by [facility], by registering with the following website: [email or web address]

## 2018-11-30 NOTE — DISCHARGE NOTE ADULT - ADDITIONAL INSTRUCTIONS
Follow up with your primary care doctor, lung doctor and cancer doctor within 1-2 weeks after discharge from the hospital. Follow up with your primary care doctor Dr. Sanchez in 2-3 days after discharge as well as your lung doctor (Dr Cooper) in less than a week after discharge. Please see your cancer Dr upon discharge as directed

## 2018-11-30 NOTE — DISCHARGE NOTE ADULT - PLAN OF CARE
Symptom prevention You have a history of COPD. In the hospital you were treated for an exacerbation of this condition. Continue using your home inhalers upon discharge. You are being sent home on a steroid taper. It is important that you take the steroids according to the instructions. A taper dose is one that starts at higher doses and slowly decreases the amount of steroids.   Follow-up with your primary care doctor within one week of discharge, as well as your lung and cancer doctors further monitoring of this condition SBP <140 Follow a low sodium diet. Continue taking your blood pressure medications as prescribed. Follow up with your Primary Care Doctor to continue having your blood pressure checked on a continual basis. LDL < 120 Follow a low fat diet. Continue taking your cholesterol medications as prescribed. Follow up with your Primary Care Doctor to continue having your cholesterol levels checked on a continual basis. symptom prevention If you have been prescribed medication to control your gout, be sure to take it as prescribed. Avoid foods that are known to trigger gout flares such as: red meat/organ meat, shellfish, refined carbohydrates (ex. white bread, white rice, pasta, sugar), processed foods (ex. chips, snack foods, frozen dinners), sugary beverages, alcohol (no more that 1-2 drinks in a 24hr period). Follow up - If you cough up blood, experience chest pain, shortness of breath, call your primary care physician or go to the ED immediately  - Take medication as prescribed  - Follow up with primary care physician and your oncologist/cancer doctor after discharge. You were treated in the hospital with steroids by IV and tolerated oral steroids well   Continue to use your oxygen at home as you have been prior to admission   Please see your lung doctor on discharge (Dr. Taylor)  Please complete your prednisone taper and see your doctors before it finishes Follow a low sodium diet. Continue taking your blood pressure medications as prescribed. Please check your Blood pressure at home and if the top number is always over 150 or if the bottom number is always over 100, please call your doctor continue with you statin and follow a low cholesterol diet please continue to take your gout medications as you were prior to admission Please follow up with your oncologist to continue with your planned chemotherapy on discharge

## 2018-11-30 NOTE — PROGRESS NOTE ADULT - ASSESSMENT
72 year old male with PMH COPD on home O2 prn, Lung Ca s/p RL lobectomy and currently on chemo (last tx 2 weeks ago), HTN, HLD presents with 1 month of progressively worsening SOB and cough being admitted for COPD exacerbation.    COPD Exacerbation  No inciting event, negative RVP.  CXR- Mid right lower lung field fiduciary markers projecting over a 1.6 cm nodular opacity  Spiral CT- Negative for PE, R middle lobe nodule  Doppler US- no dvt  Duoneb q4h and Albuterol PRN q2hrs in between  Solumedrol 40mg IV q8 hrs  s/p Levaquin 750mg IVPB once  c/w Zithromax 250mg x4 days  Pantoprazole 40mg PO daily  Sputum culture, Legionella Ag.  Pulmonary recommendations appreciated.    Lung CA  S/p RL lobectomy and currently on chemo (last tx 2 weeks ago)  CT Angio: increased size of right middle lobe nodule in association with radiation fiducial marker. Increased surrounding airspace opacity. Continued attention on follow-up imaging is needed to differentiate between   residual tumor and posttreatment change.  Right MediPort.     Hypertension  continue home medication  Atenolol 50mg PO daily    Chronic Lymphedema  c/w Lasix 40mg PO daily     Hypercholesterolemia  Continue home medication  Simvastatin 20mg daily.    Gout  Continue with home medication  Allopurinol 300mg daily    Preventive Measure  Lovenox 40mg QD 72 year old male with PMH COPD on home O2 prn, Lung Ca s/p RL lobectomy and currently on chemo (last tx 2 weeks ago), HTN, HLD presents with 1 month of progressively worsening SOB and cough being admitted for COPD exacerbation.    COPD Exacerbation  No inciting event, negative RVP.  CXR- Mid right lower lung field fiduciary markers projecting over a 1.6 cm nodular opacity  Spiral CT- Negative for PE, R middle lobe nodule  Duoneb q4h and Albuterol PRN q2hrs in between  Solumedrol 40mg IV q8 hrs  s/p Levaquin 750mg IVPB once  c/w Zithromax 250mg x4 days  Pantoprazole 40mg PO daily  Sputum culture, Legionella Ag.  Pulmonary recommendations appreciated.    Lung CA  S/p RL lobectomy and currently on chemo (last tx 2 weeks ago)  CT Angio: increased size of right middle lobe nodule in association with radiation fiducial marker. Increased surrounding airspace opacity.   Continued attention on follow-up imaging is needed to differentiate between residual tumor and posttreatment change.  oncology consult.     Hypertension  continue home medication  Atenolol 50mg PO daily    Chronic Lymphedema  c/w Lasix 40mg PO daily   lower ext doppler  neg for dvt     Hypercholesterolemia  Continue home medication  Simvastatin 20mg daily.    Gout/ stable   Continue with home medication  Allopurinol 300mg daily    Preventive Measure  Lovenox 40mg QD

## 2018-11-30 NOTE — CONSULT NOTE ADULT - SUBJECTIVE AND OBJECTIVE BOX
PULMONARY CONSULT NOTE      ALIVIA NICHOLSONN-[id number]    Patient is a 72y old  Male who presents with a chief complaint of Shortness of breath and cough. (29 Nov 2018 20:35)  Patient known to us was seen by  yesterday in office where he was noted to have significant worsening in baseline dyspnea.  Advised ER evaluation to r/o PE>CTA done and reviewed.  No PE but increased density at prior RT site which was completed a year ago.  On Opdivo.  Reports nasal congestion, cough but no fever.  S/P RLLobectomy for lung cancer with need for subsequent RT/chemo.  Denies chest pain and doppler negative.  Former smoker.       INTERVAL HPI/OVERNIGHT EVENTS:    MEDICATIONS  (STANDING):  ALBUTerol/ipratropium for Nebulization 3 milliLiter(s) Nebulizer every 4 hours  allopurinol 300 milliGRAM(s) Oral daily  aspirin  chewable 81 milliGRAM(s) Oral daily  ATENolol  Tablet 50 milliGRAM(s) Oral daily  azithromycin   Tablet 250 milliGRAM(s) Oral daily  cholecalciferol 1000 Unit(s) Oral daily  enoxaparin Injectable 40 milliGRAM(s) SubCutaneous every 24 hours  furosemide    Tablet 40 milliGRAM(s) Oral daily  influenza   Vaccine 0.5 milliLiter(s) IntraMuscular once  methylPREDNISolone sodium succinate Injectable 40 milliGRAM(s) IV Push every 12 hours  pregabalin 50 milliGRAM(s) Oral three times a day  simvastatin 20 milliGRAM(s) Oral at bedtime      MEDICATIONS  (PRN):  ALBUTerol    0.083% 2.5 milliGRAM(s) Nebulizer every 2 hours PRN COPD  guaiFENesin    Syrup 200 milliGRAM(s) Oral every 6 hours PRN Cough/congestion      Allergies    No Known Allergies    Intolerances    adhesives (Blisters)      PAST MEDICAL & SURGICAL HISTORY:  Lung cancer  Gout  Dyslipidemia  Essential hypertension  COPD (chronic obstructive pulmonary disease)  History of malignant neoplasm of right bronchus or lung  S/P foot surgery, right: ganglion x 3  S/P carpal tunnel release: L  S/P arthroscopic knee surgery: L x 3      FAMILY HISTORY:  Family history of acute myocardial infarction (Sibling)  Family history of stroke      SOCIAL HISTORY  Smoking History: Former    REVIEW OF SYSTEMS:    CONSTITUTIONAL:  As per HPI.    HEENT:  Eyes:  No diplopia or blurred vision. ENT:  No earache, sore throat or runny nose.    CARDIOVASCULAR:  No pressure, squeezing, tightness, heaviness or aching about the chest; no palpitations.    RESPIRATORY:  Per HPI    GASTROINTESTINAL:  No nausea, vomiting or diarrhea.    GENITOURINARY:  No dysuria, frequency or urgency.    MUSCULOSKELETAL:  No joint pains    SKIN:  No new lesions.    NEUROLOGIC:  No paresthesias, fasciculations, seizures or weakness.    PSYCHIATRIC:  No disorder of thought or mood.    ENDOCRINE:  No heat or cold intolerance, polyuria or polydipsia.    HEMATOLOGICAL:  No easy bruising or bleeding.     Vital Signs Last 24 Hrs  T(C): 36.6 (30 Nov 2018 07:56), Max: 36.9 (29 Nov 2018 21:40)  T(F): 97.9 (30 Nov 2018 07:56), Max: 98.4 (29 Nov 2018 21:40)  HR: 76 (30 Nov 2018 09:17) (56 - 79)  BP: 117/69 (30 Nov 2018 07:56) (109/55 - 149/85)  BP(mean): --  RR: 20 (30 Nov 2018 07:56) (18 - 20)  SpO2: 96% (30 Nov 2018 09:17) (94% - 96%)    PHYSICAL EXAMINATION:    GENERAL: The patient is a well-developed, well-nourished _____in no apparent distress.     HEENT: Head is normocephalic and atraumatic. Extraocular muscles are intact. Mucous membranes are moist.     NECK: Supple.     LUNGS:No wheeze, decreased breath sounds right side.    HEART: Regular rate and rhythm without murmur.    ABDOMEN: Soft, nontender, and nondistended.  No hepatosplenomegaly is noted.    EXTREMITIES: Without any cyanosis, clubbing, rash, lesions or edema.    NEUROLOGIC: Grossly intact.    SKIN: No ulceration or induration present.      LABS:                        13.8   3.2   )-----------( 161      ( 30 Nov 2018 04:41 )             42.6     11-30    140  |  102  |  15.0  ----------------------------<  164<H>  3.9   |  22.0  |  0.90    Ca    9.4      30 Nov 2018 04:41  Phos  3.0     11-30  Mg     2.0     11-30    TPro  6.3<L>  /  Alb  4.1  /  TBili  0.8  /  DBili  x   /  AST  27  /  ALT  29  /  AlkPhos  74  11-29    PT/INR - ( 29 Nov 2018 15:22 )   PT: 12.5 sec;   INR: 1.08 ratio         PTT - ( 29 Nov 2018 15:22 )  PTT:31.6 sec      CARDIAC MARKERS ( 29 Nov 2018 15:22 )  x     / <0.01 ng/mL / x     / x     / x            Serum Pro-Brain Natriuretic Peptide: 225 pg/mL (11-29-18 @ 15:22)          MICROBIOLOGY:    RADIOLOGY & ADDITIONAL STUDIES:< from: CT Angio Chest w/ IV Cont (11.29.18 @ 17:10) >   EXAM:  CT ANGIO CHEST (W)AW IC                          PROCEDURE DATE:  11/29/2018          INTERPRETATION:  CT ANGIO CHEST (W)AW IC    HISTORY:  Shortness of breath, right lower lobe recurrent squamous cell   carcinoma    TECHNIQUE: Contrast enhanced CT pulmonary angiogram was performed.   Multiplanar CT and HRCT images were reviewed. Maximum intensity   projection (MIP) images are reconstructed as per CT angiography protocol.   Images were acquired during the administration of 82 cc Omnipaque 350 IV   contrast. This study was performed using automatic exposure control   (radiation dose reduction software) to obtain a diagnostic image quality   scan with patient dose as low as reasonably achievable.    COMPARISON: PET/CT 9/26/2018, CTPA 7/5/2017    PULMONARY ARTERIES: No pulmonary embolism.    LUNGS, AIRWAYS: Status post right lower lobectomy with stable soft tissue   along the suture line. Central airways are patent. Stable background   emphysema. Increased size of right middle lobenodule with adjacent   fiducial marker measuring 2.8 x 1.7 cm (3; 53), previously 1.8 x 1.5 cm.   There is surrounding airspace opacity.    PLEURA: Stable trace right pleural effusion.    HEART AND VESSELS: Right chest wall infusion port is seen withthe   catheter tip in the SVC. Normal heart size. No pericardial effusion.   Coronary artery calcifications are present. Normal caliber thoracic aorta.    MEDIASTINUM, FLORES, AXILLAE: No adenopathy.    UPPER ABDOMEN: Limited visualization is unremarkable.    BONES AND CHEST WALL: No aggressive lesion.    IMPRESSION:     No pulmonary embolism.    No acute pleural or parenchymal abnormality.    Increased size of right middle lobe nodule in association with radiation   fiducial marker. Increased surrounding airspace opacity. Continued   attention on follow-up imaging is needed to differentiate between   residual tumor and posttreatment change.      < end of copied text >

## 2018-11-30 NOTE — DISCHARGE NOTE ADULT - CARE PROVIDER_API CALL
Talya Collazo), Medical Oncology  [address]  [address]  [place] [address]  Phone: [phone]  Fax: [phone]    Jose Cooper (), Critical Care Medicine; Internal Medicine; Pulmonary Disease  [address] [facility]  [address]  [place] [address]  Phone: [phone]  Fax: [phone] Talya Collazo), Medical Oncology  [address] [facility]  [address]  [place] [address]  Phone: [phone]  Fax: [phone]    Jose Cooper (), Critical Care Medicine; Internal Medicine; Pulmonary Disease  [address] [facility]  [address]  [place] [address]  Phone: [phone]  Fax: [phone]

## 2018-11-30 NOTE — CONSULT NOTE ADULT - SUBJECTIVE AND OBJECTIVE BOX
HPI: Patient is a 72y Male seen on consultation for the evaluation and management of non small cell lung cancer, with squamous differentiation, admitted with COPD exacerbation, progressively worsening dyspnea at rest and on minimal exertion.  No fevers, chills or sweats.  Notes dry cough; no improvement with medrol dose pack.  Diagnosed with lung cancer 3/16 after presenting with RLL mass and infrahilar adenopathy; Had VATS procedure done, with RLL lobectomy and mediastinal LN dissection 4/29/16, diagnosed as Stage IA.  Developed recurrence on CT 4/10/17 with new 1.2 cm nodule right lung; CT guided bx showed moderate to poorly differentiated SQ cell ca; treated with Cyberknife 7/17.    Received Keytruda 8/17 to 1/18; dose reduced with worsening arthralgia  With progressive dz, began Opdivo 3/18; Gemzar, Taxotere added 4/20/18 and has been receiving to present, last treated 2 weeks ago.      PAST MEDICAL & SURGICAL HISTORY:  Lung cancer, s/p VATS, RL lobectomy and mediastinal LN dissection 4/29/16  Gout  Dyslipidemia  Essential hypertension  COPD (chronic obstructive pulmonary disease)  History of malignant neoplasm of right bronchus or lung  S/P foot surgery, right: ganglion x 3  S/P carpal tunnel release: L  S/P arthroscopic knee surgery: L x 3      REVIEW OF SYSTEMS      General:OSULLIVAN on minimal exertion; fatigue	    Skin/Breast:no rash  	  Ophthalmologic:no visual complaints   	  ENMT:	no dysphagia or odynophagia    Respiratory and Thorax:dyspnea, dry cough  	  Cardiovascular:	no chest pain or palpitations    Gastrointestinal:	no N/V/Abdominal pain    Genitourinary:	no dysuria or hematuria    Musculoskeletal:	diffuse arthralgias        	    Hematology/Lymphatics:	no bleeding    	        MEDICATIONS  (STANDING):  ALBUTerol/ipratropium for Nebulization 3 milliLiter(s) Nebulizer every 4 hours  allopurinol 300 milliGRAM(s) Oral daily  aspirin  chewable 81 milliGRAM(s) Oral daily  ATENolol  Tablet 50 milliGRAM(s) Oral daily  azithromycin   Tablet 250 milliGRAM(s) Oral daily  chlorhexidine 2% Cloths 1 Application(s) Topical once  cholecalciferol 1000 Unit(s) Oral daily  enoxaparin Injectable 40 milliGRAM(s) SubCutaneous every 24 hours  furosemide    Tablet 40 milliGRAM(s) Oral daily  influenza   Vaccine 0.5 milliLiter(s) IntraMuscular once  methylPREDNISolone sodium succinate Injectable 40 milliGRAM(s) IV Push every 6 hours  pregabalin 50 milliGRAM(s) Oral three times a day  simvastatin 20 milliGRAM(s) Oral at bedtime    MEDICATIONS  (PRN):  ALBUTerol    0.083% 2.5 milliGRAM(s) Nebulizer every 2 hours PRN COPD  guaiFENesin    Syrup 200 milliGRAM(s) Oral every 6 hours PRN Cough/congestion      Allergies    No Known Allergies    Intolerances    adhesives (Blisters)      SOCIAL HISTORY:    Smoking Status:former smoker  Alcohol:deied  Marital Status:  Occupation:Retired     FAMILY HISTORY:  Family history of acute myocardial infarction (Sibling)  Family history of stroke    	  	    	                Vital Signs Last 24 Hrs  T(C): 36.6 (30 Nov 2018 17:19), Max: 36.9 (29 Nov 2018 21:40)  T(F): 97.8 (30 Nov 2018 17:19), Max: 98.4 (29 Nov 2018 21:40)  HR: 92 (30 Nov 2018 17:19) (56 - 92)  BP: 122/75 (30 Nov 2018 17:19) (109/51 - 149/85)  BP(mean): --  RR: 22 (30 Nov 2018 17:19) (18 - 22)  SpO2: 89% (30 Nov 2018 17:19) (89% - 97%)    PHYSICAL EXAM:      Constitutional:fatigued    Eyes:anicteric    ENMT:no lesion    Neck:no adenopathy            Respiratory:diffusely decreased BS    Cardiovascular:RRR normal S1S2    Gastrointestinal:soft, non-tender, with pos BS          Extremities:no edema, pos clubbing                LABS:                        13.8   3.2   )-----------( 161      ( 30 Nov 2018 04:41 )             42.6     11-30    140  |  102  |  15.0  ----------------------------<  164<H>  3.9   |  22.0  |  0.90    Ca    9.4      30 Nov 2018 04:41  Phos  3.0     11-30  Mg     2.0     11-30    TPro  6.3<L>  /  Alb  4.1  /  TBili  0.8  /  DBili  x   /  AST  27  /  ALT  29  /  AlkPhos  74  11-29    PT/INR - ( 29 Nov 2018 15:22 )   PT: 12.5 sec;   INR: 1.08 ratio         PTT - ( 29 Nov 2018 15:22 )  PTT:31.6 sec      RADIOLOGY & ADDITIONAL STUDIES:

## 2018-11-30 NOTE — CONSULT NOTE ADULT - ASSESSMENT
Patient with increased dyspnea.  No PE.  No obvious pneumonia.  Increased size of RML density concerning for progressive malignancy.  RT completed last year>unlikely related to NEW radiation changes.  Could be inflammatory however.  ?developing worsening pulmonary hypertension in view of COPD, s/p resection , etc.      Plan:  1.IV steroids for 24 hours>then change to PO  2.Empiric zithromax  3.Bronchodilators  4.O2  5.If stable tomorrow>consider d/c to home on taper of oral prednisone,completion of abx and continuation of bronchodilators and O2.

## 2018-12-01 LAB
ANION GAP SERPL CALC-SCNC: 16 MMOL/L — SIGNIFICANT CHANGE UP (ref 5–17)
BUN SERPL-MCNC: 26 MG/DL — HIGH (ref 8–20)
CALCIUM SERPL-MCNC: 9.4 MG/DL — SIGNIFICANT CHANGE UP (ref 8.6–10.2)
CHLORIDE SERPL-SCNC: 104 MMOL/L — SIGNIFICANT CHANGE UP (ref 98–107)
CO2 SERPL-SCNC: 23 MMOL/L — SIGNIFICANT CHANGE UP (ref 22–29)
CREAT SERPL-MCNC: 0.95 MG/DL — SIGNIFICANT CHANGE UP (ref 0.5–1.3)
EOSINOPHIL # BLD AUTO: 0 K/UL — SIGNIFICANT CHANGE UP (ref 0–0.5)
EOSINOPHIL NFR BLD AUTO: 0 % — SIGNIFICANT CHANGE UP (ref 0–5)
GLUCOSE SERPL-MCNC: 158 MG/DL — HIGH (ref 70–115)
HCT VFR BLD CALC: 39.9 % — LOW (ref 42–52)
HGB BLD-MCNC: 12.8 G/DL — LOW (ref 14–18)
LEGIONELLA AG UR QL: NEGATIVE — SIGNIFICANT CHANGE UP
LYMPHOCYTES # BLD AUTO: 0.5 K/UL — LOW (ref 1–4.8)
LYMPHOCYTES # BLD AUTO: 4.8 % — LOW (ref 20–55)
MAGNESIUM SERPL-MCNC: 2.4 MG/DL — SIGNIFICANT CHANGE UP (ref 1.8–2.6)
MCHC RBC-ENTMCNC: 28.3 PG — SIGNIFICANT CHANGE UP (ref 27–31)
MCHC RBC-ENTMCNC: 32.1 G/DL — SIGNIFICANT CHANGE UP (ref 32–36)
MCV RBC AUTO: 88.3 FL — SIGNIFICANT CHANGE UP (ref 80–94)
MONOCYTES # BLD AUTO: 0.2 K/UL — SIGNIFICANT CHANGE UP (ref 0–0.8)
MONOCYTES NFR BLD AUTO: 1.9 % — LOW (ref 3–10)
NEUTROPHILS # BLD AUTO: 9.1 K/UL — HIGH (ref 1.8–8)
NEUTROPHILS NFR BLD AUTO: 92.9 % — HIGH (ref 37–73)
PHOSPHATE SERPL-MCNC: 3.4 MG/DL — SIGNIFICANT CHANGE UP (ref 2.4–4.7)
PLATELET # BLD AUTO: 203 K/UL — SIGNIFICANT CHANGE UP (ref 150–400)
POTASSIUM SERPL-MCNC: 3.9 MMOL/L — SIGNIFICANT CHANGE UP (ref 3.5–5.3)
POTASSIUM SERPL-SCNC: 3.9 MMOL/L — SIGNIFICANT CHANGE UP (ref 3.5–5.3)
RBC # BLD: 4.52 M/UL — LOW (ref 4.6–6.2)
RBC # FLD: 18.5 % — HIGH (ref 11–15.6)
SODIUM SERPL-SCNC: 143 MMOL/L — SIGNIFICANT CHANGE UP (ref 135–145)
WBC # BLD: 9.8 K/UL — SIGNIFICANT CHANGE UP (ref 4.8–10.8)
WBC # FLD AUTO: 9.8 K/UL — SIGNIFICANT CHANGE UP (ref 4.8–10.8)

## 2018-12-01 PROCEDURE — 99233 SBSQ HOSP IP/OBS HIGH 50: CPT

## 2018-12-01 PROCEDURE — 99232 SBSQ HOSP IP/OBS MODERATE 35: CPT | Mod: GC

## 2018-12-01 RX ADMIN — Medication 3 MILLILITER(S): at 04:49

## 2018-12-01 RX ADMIN — Medication 3 MILLILITER(S): at 15:44

## 2018-12-01 RX ADMIN — Medication 3 MILLILITER(S): at 11:59

## 2018-12-01 RX ADMIN — Medication 50 MILLIGRAM(S): at 13:01

## 2018-12-01 RX ADMIN — Medication 200 MILLIGRAM(S): at 13:00

## 2018-12-01 RX ADMIN — SIMVASTATIN 20 MILLIGRAM(S): 20 TABLET, FILM COATED ORAL at 22:38

## 2018-12-01 RX ADMIN — Medication 3 MILLILITER(S): at 20:33

## 2018-12-01 RX ADMIN — Medication 3 MILLILITER(S): at 00:31

## 2018-12-01 RX ADMIN — Medication 50 MILLIGRAM(S): at 22:37

## 2018-12-01 RX ADMIN — Medication 81 MILLIGRAM(S): at 13:01

## 2018-12-01 RX ADMIN — ATENOLOL 50 MILLIGRAM(S): 25 TABLET ORAL at 13:03

## 2018-12-01 RX ADMIN — Medication 40 MILLIGRAM(S): at 06:03

## 2018-12-01 RX ADMIN — Medication 300 MILLIGRAM(S): at 13:01

## 2018-12-01 RX ADMIN — Medication 50 MILLIGRAM(S): at 06:09

## 2018-12-01 RX ADMIN — ENOXAPARIN SODIUM 40 MILLIGRAM(S): 100 INJECTION SUBCUTANEOUS at 06:10

## 2018-12-01 RX ADMIN — Medication 1000 UNIT(S): at 13:01

## 2018-12-01 RX ADMIN — Medication 40 MILLIGRAM(S): at 06:09

## 2018-12-01 NOTE — PROGRESS NOTE ADULT - ASSESSMENT
Imp:  Squamous cell lung carcinoma; post chemo with interval resolution of previous hypermetabolic portacaval LN and stable right lung nodule  Admitted with COPD exacerbation-management per Pulmonary - improved with steroids and nasal O2    management as per pulmonary   Additional chemo as outpt  d/c planning

## 2018-12-01 NOTE — PROGRESS NOTE ADULT - SUBJECTIVE AND OBJECTIVE BOX
Patient is a 72y old  Male who presents with a chief complaint of Shortness of breath and cough. (01 Dec 2018 15:37)    INTERVAL HPI/OVERNIGHT EVENTS:  No acute overnight events,  Pt has improvement of SOB, continues with cough  afebrile, VS WNL  tolerating PO, voiding spontaneously.     CARDIOVASCULAR:  ATENolol  Tablet 50 milliGRAM(s) Oral daily  furosemide    Tablet 40 milliGRAM(s) Oral daily    PULMONARY:  ALBUTerol    0.083% 2.5 milliGRAM(s) Nebulizer every 2 hours PRN  ALBUTerol/ipratropium for Nebulization 3 milliLiter(s) Nebulizer every 4 hours  guaiFENesin    Syrup 200 milliGRAM(s) Oral every 6 hours PRN    NEUROLOGIC:  pregabalin 50 milliGRAM(s) Oral three times a day    HEMATOLOGIC:  aspirin  chewable 81 milliGRAM(s) Oral daily  enoxaparin Injectable 40 milliGRAM(s) SubCutaneous every 24 hours    ENDO/METABOLIC:  allopurinol 300 milliGRAM(s) Oral daily  predniSONE   Tablet 40 milliGRAM(s) Oral daily  simvastatin 20 milliGRAM(s) Oral at bedtime    IV FLUID/NUTRITION:  cholecalciferol 1000 Unit(s) Oral daily    TOPICAL:  chlorhexidine 2% Cloths 1 Application(s) Topical once    IMMUNOLOGIC & OTHER  influenza   Vaccine 0.5 milliLiter(s) IntraMuscular once      Allergies    No Known Allergies    Intolerances    adhesives (Blisters)    Vital Signs Last 24 Hrs  T(C): 36.6 (01 Dec 2018 15:33), Max: 36.6 (01 Dec 2018 00:08)  T(F): 97.8 (01 Dec 2018 15:33), Max: 97.8 (01 Dec 2018 00:08)  HR: 75 (01 Dec 2018 15:45) (69 - 80)  BP: 106/58 (01 Dec 2018 15:33) (106/58 - 122/60)  RR: 20 (01 Dec 2018 15:33) (20 - 20)  SpO2: 97% (01 Dec 2018 15:45) (94% - 99%)      Daily     Daily   I&O's Detail    PHYSICAL EXAM:    General:NAD, well nourished and well developed  HEENT: Normocephalic, atraumatic, clear sclera, PERRLA, EOMI, Clear nares, Moist Mucosas, Normal Pharynx, No exudates, no lesions   Neck: Supple, no carotid bruits, no JVD, No thyromegaly  Respiratory: Normal respiratory rate and effort, moderate expiratory wheezing in all lung fields, speaking in full sentences   Cardiac: Regular rate and rhythm, no murmurs, rubs or gallop.  GI: Abdomen is soft, nontender, nondistended, no rebound or guarding, bowel sounds are normal.  Extremities: No cyanosis, no edema, pedal pulses +2 bilaterally.  Neurological: Patient is alert and oriented x4, CN II-XII grossly intact, no sensory or motor deficits.  Psych: Normal speech and affect, good eye contact. No behavioural disturbances.  Skin: Appropriate skin color to race and age, no abnormal discolorations, no bruises, no bleeding, no lacerations.    LABS:                        12.8   9.8   )-----------( 203      ( 01 Dec 2018 06:45 )             39.9     CBC Full  -  ( 01 Dec 2018 06:45 )  WBC Count : 9.8 K/uL  Hemoglobin : 12.8 g/dL  Hematocrit : 39.9 %  Platelet Count - Automated : 203 K/uL  Mean Cell Volume : 88.3 fl  Mean Cell Hemoglobin : 28.3 pg  Mean Cell Hemoglobin Concentration : 32.1 g/dL  Auto Neutrophil # : 9.1 K/uL  Auto Lymphocyte # : 0.5 K/uL  Auto Monocyte # : 0.2 K/uL  Auto Eosinophil # : 0.0 K/uL  Auto Basophil # : x  Auto Neutrophil % : 92.9 %  Auto Lymphocyte % : 4.8 %  Auto Monocyte % : 1.9 %  Auto Eosinophil % : 0.0 %  Auto Basophil % : x        143  |  104  |  26.0<H>  ----------------------------<  158<H>  3.9   |  23.0  |  0.95    Ca    9.4      01 Dec 2018 06:45  Phos  3.4     12-  Mg     2.4         Serum Pro-Brain Natriuretic Peptide: 225 pg/mL ( @ 15:22)    RADIOLOGY & ADDITIONAL TESTS:    < from: TTE Echo Complete w/Doppler (18 @ 13:48) >  EXAM:  ECHO TRANSTHORACIC COMP W DOPP      PROCEDURE DATE:  2018   .      INTERPRETATION:  REPORT:    TRANSTHORACIC ECHOCARDIOGRAM REPORT         Patient Name:   ALIVIA NICHOLSON Patient Location: [facility] Rec #:  [id number]      Accession #:      [id number]  Account #:                    Height:           66.9 in 170.0 cm  YOB: 1945    Weight:           246.9 lb 112.00 kg  Patient Age:    72 years      BSA:              2.21 m²  Patient Gender: M             BP:         117/69 mmHg       Date of Exam:        2018 1:48:36 PM  Sonographer:         Dana Guerra  Referring Physician: Roz Stock MD    Procedure:     2D Echo/Doppler/Color Doppler Complete and Echocardiogram   with                 Definity Contrast.  Indications:   Dyspnea, unspecified - R06.00  Diagnosis:     Dyspnea, unspecified - R06.00  Study Details: Technically difficult study. Study quality was adversely   affected                 due to patient obesity and COPD.         2D AND M-MODE MEASUREMENTS (normal ranges within parentheses):  Left Ventricle:                 Normal    Aorta/Left Atrium:              Normal  IVSd (2D):              1.17 cm (0.7-1.1) LA Volume Index    34.0 ml/m²  LVPWd (2D):             1.16 cm (0.7-1.1) Right Ventricle:  LVIDd (2D):             4.17 cm (3.4-5.7) TAPSE:           2.53 cm  LVIDs (2D):             2.21 cm  LV FS (2D):             47.0 %  (>25%)  Relative Wall Thickness 0.56    (<0.42)    LV SYSTOLIC FUNCTION BY 2D PLANIMETRY (MOD):  EF-A4C View: 70.9 % EF-A2C View: 77.8 %    LV DIASTOLIC FUNCTION:  MV Peak E: 0.83 m/s E/e' Ratio: 10.60  MV Peak A: 0.92 m/s Decel Time: 246 msec  E/A Ratio: 0.90    SPECTRAL DOPPLER ANALYSIS (where applicable):  Mitral Valve:  MV P1/2 Time: 71.34 msec  MV Area, PHT: 3.08 cm²    Aortic Valve: AoV Max Artur: 2.85 m/s AoV Peak P.5 mmHg AoV Mean P.8 mmHg    LVOT Vmax: 1.26 m/s LVOT VTI: 0.267 m LVOT Diameter: 2.52 cm    AoV Area, Vmax: 2.20 cm² AoV Area, VTI: 2.31 cm² AoV Area, Vmn: 1.80cm²  Ao VTI: 0.576  Tricuspid Valve and PA/RV Systolic Pressure: TR Max Velocity: 2.62 m/s RA   Pressure: 3 mmHg RVSP/PASP: 30.5 mmHg       PHYSICIAN INTERPRETATION:  Left Ventricle: Endocardial visualization was enhanced with intravenous   echo contrast. The left ventricular internal cavity size is normal. Left   ventricular wall thickness is normal.  Global LV systolic function was normal. Left ventricular ejection   fraction, by visual estimation, is 65 to 70%. Spectral Doppler shows   impaired relaxation pattern of left ventricular myocardial filling (Grade   I diastolic dysfunction).  Right Ventricle: Normal right ventricular size and function. TV S' 1.9   m/s.  Left Atrium: Mildly enlarged left atrium.  Right Atrium: Normal right atrialsize.  Pericardium: There is no evidence of pericardial effusion.  Mitral Valve: The mitral valve is normal in structure. Trace mitral valve   regurgitation is seen.  Tricuspid Valve: Trivial tricuspid regurgitation is visualized.  Aortic Valve: Mildaortic stenosis is present. Peak Av velocity is 2.85   m/s, mean transaortic gradient equals 17.8 mmHg. No evidence of aortic   valve regurgitation is seen.  Pulmonic Valve: The pulmonic valve was not well visualized. Trace   pulmonic valve regurgitation.  Aorta: The aortic root is normal in size and structure.  Pulmonary Artery: The pulmonary artery is not well seen.  Venous: A normal flow pattern is recorded from the right upper pulmonary   vein. The inferior vena cava was normal sized, with respiratory size   variation greater than 50%.       Summary:   1. Left ventricular ejection fraction, by visual estimation, is 65 to   70%.   2. Normal global left ventricular systolic function.   3. Normal left ventricular internal cavity size.   4. Spectral Doppler shows impaired relaxation pattern of left   ventricular myocardial filling (Grade I diastolic dysfunction).   5. There is no evidence of pericardial effusion.   6. Trace tricuspid regurgitation.   7. Mild aortic valve stenosis.   8. Trace pulmonic valve regurgitation.   9. Endocardial visualization was enhanced with intravenous echo contrast.    [id number] Brandyn Kelley MD, Electronically signed on 2018 at 9:53:35   PM     *** Final ***    BRANDYN BERNSTEIN  This document has been electronically signed. 2018  1:48PM        < end of copied text >    MEDICATIONS  (STANDING):  ALBUTerol/ipratropium for Nebulization 3 milliLiter(s) Nebulizer every 4 hours  allopurinol 300 milliGRAM(s) Oral daily  aspirin  chewable 81 milliGRAM(s) Oral daily  ATENolol  Tablet 50 milliGRAM(s) Oral daily  chlorhexidine 2% Cloths 1 Application(s) Topical once  cholecalciferol 1000 Unit(s) Oral daily  enoxaparin Injectable 40 milliGRAM(s) SubCutaneous every 24 hours  furosemide    Tablet 40 milliGRAM(s) Oral daily  influenza   Vaccine 0.5 milliLiter(s) IntraMuscular once  predniSONE   Tablet 40 milliGRAM(s) Oral daily  pregabalin 50 milliGRAM(s) Oral three times a day  simvastatin 20 milliGRAM(s) Oral at bedtime    MEDICATIONS  (PRN):  ALBUTerol    0.083% 2.5 milliGRAM(s) Nebulizer every 2 hours PRN COPD  guaiFENesin    Syrup 200 milliGRAM(s) Oral every 6 hours PRN Cough/congestion

## 2018-12-01 NOTE — PROGRESS NOTE ADULT - ASSESSMENT
Patient is a 72 year old male with PMH COPD on home O2 prn, Lung Ca s/p RL lobectomy and currently on chemo (last tx 2 weeks ago), HTN, HLD presents with 1 month of progressively worsening SOB and cough being admitted for COPD exacerbation.    COPD Exacerbation- improving   No inciting event, negative RVP.  Imaging with noted RLL nodular opacity that is larger with a negative PE ct chest and a confirmed RML nodule on ct chest   -c/w steroids, change to PO and observe   -d/c zithromax (looked at Pulm note, however, no c/o diarrhea to us)   -d/c abx per pulm   -c/w nebs as needed   -Elevated BUN likely 2/2 steroids, will monitor     Lung CA ([facility])   S/p RL lobectomy and currently on chemo (last tx 2 weeks ago)  Onc f/up noted    Hypertension   continue home medication  Atenolol 50mg PO daily    Chronic Lymphedema  c/w Lasix 40mg PO daily   lower ext doppler  neg for dvt     normocytic anemia - likely 2/2 aocd   Will monitor     Hypercholesterolemia  Simvastatin 20mg daily.    Gout/ stable   Continue with home medication  Allopurinol 300mg daily    Preventive Measure  Lovenox 40mg QD    Prognosis noted. Plan d/w patient and daughter at bedside

## 2018-12-01 NOTE — PROGRESS NOTE ADULT - SUBJECTIVE AND OBJECTIVE BOX
cc: sob     INTERVAL/OVERNIGHT EVENTS  Patient seen and examined at bedside. No acute distress and no acute overnight events. states that he is feeling a lot better and is ready to go home. Concerned about making it to a dental appt on monday 12/3     ROS: No chest pain, palpitations, light headedness/dizziness, difficulty breathing/cough, fevers/chills, abdominal pain, n/v, diarrhea/constipation, dysuria or increased urinary frequency. All other ROS negative     Vital Signs Last 24 Hrs  T(C): 36.6 (01 Dec 2018 15:33), Max: 36.6 (30 Nov 2018 17:19)  T(F): 97.8 (01 Dec 2018 15:33), Max: 97.8 (30 Nov 2018 17:19)  HR: 75 (01 Dec 2018 15:33) (69 - 92)  BP: 106/58 (01 Dec 2018 15:33) (106/58 - 122/75)  RR: 20 (01 Dec 2018 15:33) (19 - 22)  SpO2: 97% (01 Dec 2018 15:33) (89% - 99%)    Exam:   GENERAL: No acute distress, well-developed, obese male on supplemental O2.  HEENT: Normocephalic, atraumatic; EOMI, PERRLA, moist mucous membranes.  NECK: Supple, No JVD, Right MediPort   RESP: Mildly tachypneic, no use of accessory muscles, no retractions, diffuse mild expiratory wheezing noted bilaterally  CARDIO: Regular rate and rhythm; No murmurs, rubs, or gallops  ABDOMEN: Soft, Nontender, Obese; Bowel sounds present  EXTREMITIES: No clubbing, cyanosis, or edema. 2+BL pitting edema.  NEUROLOGY: Alert and oriented x3, no focal neurological deficits.  SKIN: No rashes or lesions    LABS:                         12.8   9.8   )-----------( 203      ( 01 Dec 2018 06:45 )             39.9   12-01    143  |  104  |  26.0<H>  ----------------------------<  158<H>  3.9   |  23.0  |  0.95    Ca    9.4      01 Dec 2018 06:45  Phos  3.4     12-01  Mg     2.4     12-01    MEDICATIONS  (STANDING):  ALBUTerol/ipratropium for Nebulization 3 milliLiter(s) Nebulizer every 4 hours  allopurinol 300 milliGRAM(s) Oral daily  aspirin  chewable 81 milliGRAM(s) Oral daily  ATENolol  Tablet 50 milliGRAM(s) Oral daily  chlorhexidine 2% Cloths 1 Application(s) Topical once  cholecalciferol 1000 Unit(s) Oral daily  enoxaparin Injectable 40 milliGRAM(s) SubCutaneous every 24 hours  furosemide    Tablet 40 milliGRAM(s) Oral daily  influenza   Vaccine 0.5 milliLiter(s) IntraMuscular once  predniSONE   Tablet 40 milliGRAM(s) Oral daily  pregabalin 50 milliGRAM(s) Oral three times a day  simvastatin 20 milliGRAM(s) Oral at bedtime    MEDICATIONS  (PRN):  ALBUTerol    0.083% 2.5 milliGRAM(s) Nebulizer every 2 hours PRN COPD  guaiFENesin    Syrup 200 milliGRAM(s) Oral every 6 hours PRN Cough/congestion

## 2018-12-01 NOTE — PROGRESS NOTE ADULT - SUBJECTIVE AND OBJECTIVE BOX
PULMONARY PROGRESS NOTE      ALIVIA NICHOLSON[employer]-[id number]    Patient is a 72y old  Male who presents with a chief complaint of Shortness of breath and cough. (2018 20:17)      INTERVAL HPI/OVERNIGHT EVENTS:  C/O diarrhea  No real change in dyspnea with exertion  Echo done>no significant finding  MEDICATIONS  (STANDING):  ALBUTerol/ipratropium for Nebulization 3 milliLiter(s) Nebulizer every 4 hours  allopurinol 300 milliGRAM(s) Oral daily  aspirin  chewable 81 milliGRAM(s) Oral daily  ATENolol  Tablet 50 milliGRAM(s) Oral daily  azithromycin   Tablet 250 milliGRAM(s) Oral daily  chlorhexidine 2% Cloths 1 Application(s) Topical once  cholecalciferol 1000 Unit(s) Oral daily  enoxaparin Injectable 40 milliGRAM(s) SubCutaneous every 24 hours  furosemide    Tablet 40 milliGRAM(s) Oral daily  influenza   Vaccine 0.5 milliLiter(s) IntraMuscular once  predniSONE   Tablet 40 milliGRAM(s) Oral daily  pregabalin 50 milliGRAM(s) Oral three times a day  simvastatin 20 milliGRAM(s) Oral at bedtime      MEDICATIONS  (PRN):  ALBUTerol    0.083% 2.5 milliGRAM(s) Nebulizer every 2 hours PRN COPD  guaiFENesin    Syrup 200 milliGRAM(s) Oral every 6 hours PRN Cough/congestion      Allergies    No Known Allergies    Intolerances    adhesives (Blisters)      PAST MEDICAL & SURGICAL HISTORY:  Lung cancer  Gout  Dyslipidemia  Essential hypertension  COPD (chronic obstructive pulmonary disease)  History of malignant neoplasm of right bronchus or lung  S/P foot surgery, right: ganglion x 3  S/P carpal tunnel release: L  S/P arthroscopic knee surgery: L x 3      SOCIAL HISTORY  Smoking History: Former      REVIEW OF SYSTEMS:    CONSTITUTIONAL:  No distress    HEENT:  Eyes:  No diplopia or blurred vision. ENT:  No earache, sore throat or runny nose.    CARDIOVASCULAR:  No pressure, squeezing, tightness, heaviness or aching about the chest; no palpitations.    RESPIRATORY:  Per HPI    GASTROINTESTINAL:  Per HPI    GENITOURINARY:  No dysuria, frequency or urgency.    MUSCULOSKELETAL:  No joint pain    SKIN:  No new lesions.    NEUROLOGIC:  No paresthesias, fasciculations, seizures or weakness.    PSYCHIATRIC:  No disorder of thought or mood.    ENDOCRINE:  No heat or cold intolerance, polyuria or polydipsia.    HEMATOLOGICAL:  No easy bruising or bleeding.     Vital Signs Last 24 Hrs  T(C): 36.4 (01 Dec 2018 07:33), Max: 36.7 (2018 11:46)  T(F): 97.5 (01 Dec 2018 07:33), Max: 98 (2018 11:46)  HR: 74 (01 Dec 2018 07:33) (69 - 92)  BP: 122/60 (01 Dec 2018 07:33) (107/56 - 122/75)  BP(mean): --  RR: 20 (01 Dec 2018 07:33) (18 - 22)  SpO2: 99% (01 Dec 2018 07:33) (89% - 99%)    PHYSICAL EXAMINATION:    GENERAL: The patient is awake and alert in no apparent distress.     HEENT: Head is normocephalic and atraumatic. Extraocular muscles are intact. Mucous membranes are moist.    NECK: Supple.    LUNGS: Clear to auscultation without wheezing, rales or rhonchi; respirations unlabored>decreased throughout    HEART: Regular rate and rhythm without murmur.    ABDOMEN: Soft, nontender, and nondistended.      EXTREMITIES: Without any cyanosis, clubbing, rash, lesions or edema.    NEUROLOGIC: Grossly intact.    SKIN: No ulceration or induration present.      LABS:                        12.8   9.8   )-----------( 203      ( 01 Dec 2018 06:45 )             39.9     12-    143  |  104  |  26.0<H>  ----------------------------<  158<H>  3.9   |  23.0  |  0.95    Ca    9.4      01 Dec 2018 06:45  Phos  3.4     12  Mg     2.4     12    TPro  6.3<L>  /  Alb  4.1  /  TBili  0.8  /  DBili  x   /  AST  27  /  ALT  29  /  AlkPhos  74  11-    PT/INR - ( 2018 15:22 )   PT: 12.5 sec;   INR: 1.08 ratio         PTT - ( 2018 15:22 )  PTT:31.6 sec      CARDIAC MARKERS ( 2018 15:22 )  x     / <0.01 ng/mL / x     / x     / x            Serum Pro-Brain Natriuretic Peptide: 225 pg/mL (18 @ 15:22)    < from: TTE Echo Complete w/Doppler (18 @ 13:48) >  EXAM:  ECHO TRANSTHORACIC COMP W DOPP      PROCEDURE DATE:  2018   .      INTERPRETATION:  REPORT:    TRANSTHORACIC ECHOCARDIOGRAM REPORT         Patient Name:   ALIVIA NICHOLSON Patient Location: [facility] Rec #:  [id number]      Accession #:      [id number]  Account #:                    Height:           66.9 in 170.0 cm  YOB: 1945    Weight:           246.9 lb 112.00 kg  Patient Age:    72 years      BSA:              2.21 m²  Patient Gender: M             BP:         117/69 mmHg       Date of Exam:        2018 1:48:36 PM  Sonographer:         Dana Guerra  Referring Physician: Roz Stock MD    Procedure:     2D Echo/Doppler/Color Doppler Complete and Echocardiogram   with                 Definity Contrast.  Indications:   Dyspnea, unspecified - R06.00  Diagnosis:     Dyspnea, unspecified - R06.00  Study Details: Technically difficult study. Study quality was adversely   affected                 due to patient obesity and COPD.         2D AND M-MODE MEASUREMENTS (normal ranges within parentheses):  Left Ventricle:                 Normal    Aorta/Left Atrium:              Normal  IVSd (2D):              1.17 cm (0.7-1.1) LA Volume Index    34.0 ml/m²  LVPWd (2D):             1.16 cm (0.7-1.1) Right Ventricle:  LVIDd (2D):             4.17 cm (3.4-5.7) TAPSE:           2.53 cm  LVIDs (2D):             2.21 cm  LV FS (2D):             47.0 %  (>25%)  Relative Wall Thickness 0.56    (<0.42)    LV SYSTOLIC FUNCTION BY 2D PLANIMETRY (MOD):  EF-A4C View: 70.9 % EF-A2C View: 77.8 %    LV DIASTOLIC FUNCTION:  MV Peak E: 0.83 m/s E/e' Ratio: 10.60  MV Peak A: 0.92 m/s Decel Time: 246 msec  E/A Ratio: 0.90    SPECTRAL DOPPLER ANALYSIS (where applicable):  Mitral Valve:  MV P1/2 Time: 71.34 msec  MV Area, PHT: 3.08 cm²    Aortic Valve: AoV Max Artur: 2.85 m/s AoV Peak P.5 mmHg AoV Mean P.8 mmHg    LVOT Vmax: 1.26 m/s LVOT VTI: 0.267 m LVOT Diameter: 2.52 cm    AoV Area, Vmax: 2.20 cm² AoV Area, VTI: 2.31 cm² AoV Area, Vmn: 1.80cm²  Ao VTI: 0.576  Tricuspid Valve and PA/RV Systolic Pressure: TR Max Velocity: 2.62 m/s RA   Pressure: 3 mmHg RVSP/PASP: 30.5 mmHg       PHYSICIAN INTERPRETATION:  Left Ventricle: Endocardial visualization was enhanced with intravenous   echo contrast. The left ventricular internal cavity size is normal. Left   ventricular wall thickness is normal.  Global LV systolic function was normal. Left ventricular ejection   fraction, by visual estimation, is 65 to 70%. Spectral Doppler shows   impaired relaxation pattern of left ventricular myocardial filling (Grade   I diastolic dysfunction).  Right Ventricle: Normal right ventricular size and function. TV S' 1.9   m/s.  Left Atrium: Mildly enlarged left atrium.  Right Atrium: Normal right atrialsize.  Pericardium: There is no evidence of pericardial effusion.  Mitral Valve: The mitral valve is normal in structure. Trace mitral valve   regurgitation is seen.  Tricuspid Valve: Trivial tricuspid regurgitation is visualized.  Aortic Valve: Mildaortic stenosis is present. Peak Av velocity is 2.85   m/s, mean transaortic gradient equals 17.8 mmHg. No evidence of aortic   valve regurgitation is seen.  Pulmonic Valve: The pulmonic valve was not well visualized. Trace   pulmonic valve regurgitation.  Aorta: The aortic root is normal in size and structure.  Pulmonary Artery: The pulmonary artery is not well seen.  Venous: A normal flow pattern is recorded from the right upper pulmonary   vein. The inferior vena cava was normal sized, with respiratory size   variation greater than 50%.       Summary:   1. Left ventricular ejection fraction, by visual estimation, is 65 to   70%.   2. Normal global left ventricular systolic function.   3. Normal left ventricular internal cavity size.   4. Spectral Doppler shows impaired relaxation pattern of left   ventricular myocardial filling (Grade I diastolic dysfunction).   5. There is no evidence of pericardial effusion.   6. Trace tricuspid regurgitation.   7. Mild aortic valve stenosis.   8. Trace pulmonic valve regurgitation.   9. Endocardial visualization was enhanced with intravenous echo contrast.    [id number] Jose Kelley MD, Electronically signed on 2018 at 9:53:35   PM       < end of copied text >        MICROBIOLOGY:    RADIOLOGY & ADDITIONAL STUDIES:

## 2018-12-01 NOTE — PROGRESS NOTE ADULT - ASSESSMENT
Patient without evidence of PE, nor decompensation in cardiac function  Likely etiology of complaint is related to COPD and element of deconditioning  No viral illness  Has diarrhea>?related to zithromax    Plan:  1.Have placed on oral prednisone>taper as outpatient  2.Continue routine bronchodilators  3.Consider d/c of zithromax>no good indication of pulmonary infection  4.F/u with >consider d/c in next 24-48 hours if remains stable.

## 2018-12-01 NOTE — PROGRESS NOTE ADULT - ASSESSMENT
72 year old male with PMH COPD on home O2 prn, Lung Ca s/p RL lobectomy and currently on chemo (last tx 2 weeks ago), HTN, HLD presents with 1 month of progressively worsening SOB and cough being admitted for COPD exacerbation. Pt continues with moderate wheezing in all lung fields.     COPD Exacerbation  -No inciting event, negative RVP.  -CXR- Mid right lower lung field fiduciary markers projecting over a 1.6 cm nodular opacity  -Spiral CT- Negative for PE, R middle lobe nodule  -Duoneb q4h and Albuterol PRN q2hrs in between  -Solumedrol 40mg IV q8 hrs  -s/p Levaquin 750mg IVPB once  -c/w Zithromax 250mg x4 days  -Pantoprazole 40mg PO daily  -Sputum culture, Legionella Ag.  -Pulmonary recommendations appreciated.    Lung CA  S/p RL lobectomy and currently on chemo (last tx 2 weeks ago)  CT Angio: increased size of right middle lobe nodule in association with radiation fiducial marker. Increased surrounding airspace opacity.   Continued attention on follow-up imaging is needed to differentiate between residual tumor and posttreatment change.  oncology consult.     Diastolyc Heart Failure grade I with preserved LVEF.  -LVEF 65%-70%  -Spectral Doppler shows impaired relaxation pattern of left   ventricular myocardial filling (Grade I diastolic dysfunction).  -Follow up outpatient  -furosemide    Tablet 40 milliGRAM(s) Oral daily    Hypertension  continue home medication  Atenolol 50mg PO daily    Chronic Lymphedema  c/w Lasix 40mg PO daily   lower ext doppler  neg for dvt     Hypercholesterolemia  Continue home medication  Simvastatin 20mg daily.    Gout/ stable   Continue with home medication  Allopurinol 300mg daily    Preventive Measure  Lovenox 40mg QD

## 2018-12-02 LAB
ALBUMIN SERPL ELPH-MCNC: 3.4 G/DL — SIGNIFICANT CHANGE UP (ref 3.3–5.2)
ALP SERPL-CCNC: 66 U/L — SIGNIFICANT CHANGE UP (ref 40–120)
ALT FLD-CCNC: 22 U/L — SIGNIFICANT CHANGE UP
ANION GAP SERPL CALC-SCNC: 10 MMOL/L — SIGNIFICANT CHANGE UP (ref 5–17)
AST SERPL-CCNC: 24 U/L — SIGNIFICANT CHANGE UP
BILIRUB SERPL-MCNC: 0.5 MG/DL — SIGNIFICANT CHANGE UP (ref 0.4–2)
BUN SERPL-MCNC: 29 MG/DL — HIGH (ref 8–20)
CALCIUM SERPL-MCNC: 8.7 MG/DL — SIGNIFICANT CHANGE UP (ref 8.6–10.2)
CHLORIDE SERPL-SCNC: 104 MMOL/L — SIGNIFICANT CHANGE UP (ref 98–107)
CO2 SERPL-SCNC: 26 MMOL/L — SIGNIFICANT CHANGE UP (ref 22–29)
CREAT SERPL-MCNC: 0.86 MG/DL — SIGNIFICANT CHANGE UP (ref 0.5–1.3)
EOSINOPHIL # BLD AUTO: 0 K/UL — SIGNIFICANT CHANGE UP (ref 0–0.5)
EOSINOPHIL NFR BLD AUTO: 0 % — SIGNIFICANT CHANGE UP (ref 0–5)
GLUCOSE SERPL-MCNC: 84 MG/DL — SIGNIFICANT CHANGE UP (ref 70–115)
GRAM STN FLD: SIGNIFICANT CHANGE UP
HCT VFR BLD CALC: 39.8 % — LOW (ref 42–52)
HGB BLD-MCNC: 12.5 G/DL — LOW (ref 14–18)
LYMPHOCYTES # BLD AUTO: 0.9 K/UL — LOW (ref 1–4.8)
LYMPHOCYTES # BLD AUTO: 9.7 % — LOW (ref 20–55)
MCHC RBC-ENTMCNC: 28.2 PG — SIGNIFICANT CHANGE UP (ref 27–31)
MCHC RBC-ENTMCNC: 31.4 G/DL — LOW (ref 32–36)
MCV RBC AUTO: 89.8 FL — SIGNIFICANT CHANGE UP (ref 80–94)
MONOCYTES # BLD AUTO: 0.7 K/UL — SIGNIFICANT CHANGE UP (ref 0–0.8)
MONOCYTES NFR BLD AUTO: 7.4 % — SIGNIFICANT CHANGE UP (ref 3–10)
NEUTROPHILS # BLD AUTO: 7.8 K/UL — SIGNIFICANT CHANGE UP (ref 1.8–8)
NEUTROPHILS NFR BLD AUTO: 82.7 % — HIGH (ref 37–73)
PLATELET # BLD AUTO: 203 K/UL — SIGNIFICANT CHANGE UP (ref 150–400)
POTASSIUM SERPL-MCNC: 4.3 MMOL/L — SIGNIFICANT CHANGE UP (ref 3.5–5.3)
POTASSIUM SERPL-SCNC: 4.3 MMOL/L — SIGNIFICANT CHANGE UP (ref 3.5–5.3)
PROT SERPL-MCNC: 5.5 G/DL — LOW (ref 6.6–8.7)
RBC # BLD: 4.43 M/UL — LOW (ref 4.6–6.2)
RBC # FLD: 18.7 % — HIGH (ref 11–15.6)
SODIUM SERPL-SCNC: 140 MMOL/L — SIGNIFICANT CHANGE UP (ref 135–145)
SPECIMEN SOURCE: SIGNIFICANT CHANGE UP
WBC # BLD: 9.5 K/UL — SIGNIFICANT CHANGE UP (ref 4.8–10.8)
WBC # FLD AUTO: 9.5 K/UL — SIGNIFICANT CHANGE UP (ref 4.8–10.8)

## 2018-12-02 PROCEDURE — 99239 HOSP IP/OBS DSCHRG MGMT >30: CPT

## 2018-12-02 RX ORDER — FUROSEMIDE 40 MG
1 TABLET ORAL
Qty: 0 | Refills: 0 | COMMUNITY
Start: 2018-12-02

## 2018-12-02 RX ORDER — LOPERAMIDE HCL 2 MG
2 TABLET ORAL EVERY 8 HOURS
Qty: 0 | Refills: 0 | Status: DISCONTINUED | OUTPATIENT
Start: 2018-12-02 | End: 2018-12-02

## 2018-12-02 RX ADMIN — Medication 300 MILLIGRAM(S): at 13:25

## 2018-12-02 RX ADMIN — Medication 81 MILLIGRAM(S): at 13:39

## 2018-12-02 RX ADMIN — Medication 50 MILLIGRAM(S): at 13:40

## 2018-12-02 RX ADMIN — Medication 3 MILLILITER(S): at 20:23

## 2018-12-02 RX ADMIN — Medication 3 MILLILITER(S): at 03:59

## 2018-12-02 RX ADMIN — Medication 3 MILLILITER(S): at 13:08

## 2018-12-02 RX ADMIN — Medication 50 MILLIGRAM(S): at 05:06

## 2018-12-02 RX ADMIN — Medication 200 MILLIGRAM(S): at 02:56

## 2018-12-02 RX ADMIN — SIMVASTATIN 20 MILLIGRAM(S): 20 TABLET, FILM COATED ORAL at 21:24

## 2018-12-02 RX ADMIN — Medication 1000 UNIT(S): at 13:25

## 2018-12-02 RX ADMIN — Medication 3 MILLILITER(S): at 08:11

## 2018-12-02 RX ADMIN — CHLORHEXIDINE GLUCONATE 1 APPLICATION(S): 213 SOLUTION TOPICAL at 13:39

## 2018-12-02 RX ADMIN — ENOXAPARIN SODIUM 40 MILLIGRAM(S): 100 INJECTION SUBCUTANEOUS at 05:06

## 2018-12-02 RX ADMIN — Medication 50 MILLIGRAM(S): at 21:24

## 2018-12-02 RX ADMIN — Medication 40 MILLIGRAM(S): at 05:06

## 2018-12-02 RX ADMIN — Medication 40 MILLIGRAM(S): at 05:07

## 2018-12-02 RX ADMIN — Medication 3 MILLILITER(S): at 00:17

## 2018-12-02 RX ADMIN — ATENOLOL 50 MILLIGRAM(S): 25 TABLET ORAL at 05:06

## 2018-12-02 NOTE — PROGRESS NOTE ADULT - SUBJECTIVE AND OBJECTIVE BOX
HPI: Patient is a 72y Male seen on consultation for the evaluation and management of non small cell lung cancer, with squamous differentiation, admitted with COPD exacerbation, progressively worsening dyspnea at rest and on minimal exertion.  No fevers, chills or sweats.  Notes dry cough; no improvement with medrol dose pack.  Diagnosed with lung cancer 3/16 after presenting with RLL mass and infrahilar adenopathy; Had VATS procedure done, with RLL lobectomy and mediastinal LN dissection 4/29/16, diagnosed as Stage IA.  Developed recurrence on CT 4/10/17 with new 1.2 cm nodule right lung; CT guided bx showed moderate to poorly differentiated SQ cell ca; treated with Cyberknife 7/17.    Received Keytruda 8/17 to 1/18; dose reduced with worsening arthralgia  With progressive dz, began Opdivo 3/18; Gemzar, Taxotere added 4/20/18 and has been receiving to present, last treated 2 weeks ago.    feeling better today. pt on steroids       PAST MEDICAL & SURGICAL HISTORY:  Lung cancer, s/p VATS, RL lobectomy and mediastinal LN dissection 4/29/16  Gout  Dyslipidemia  Essential hypertension  COPD (chronic obstructive pulmonary disease)  History of malignant neoplasm of right bronchus or lung  S/P foot surgery, right: ganglion x 3  S/P carpal tunnel release: L  S/P arthroscopic knee surgery: L x 3      REVIEW OF SYSTEMS      General:OSULLIVAN on minimal exertion; fatigue	    Skin/Breast:no rash  	  Ophthalmologic:no visual complaints   	  ENMT:	no dysphagia or odynophagia    Respiratory and Thorax:dyspnea, dry cough  	  Cardiovascular:	no chest pain or palpitations    Gastrointestinal:	no N/V/Abdominal pain    Genitourinary:	no dysuria or hematuria    Musculoskeletal:	diffuse arthralgias        	    Hematology/Lymphatics:	no bleeding    	        MEDICATIONS  (STANDING):  ALBUTerol/ipratropium for Nebulization 3 milliLiter(s) Nebulizer every 4 hours  allopurinol 300 milliGRAM(s) Oral daily  aspirin  chewable 81 milliGRAM(s) Oral daily  ATENolol  Tablet 50 milliGRAM(s) Oral daily  azithromycin   Tablet 250 milliGRAM(s) Oral daily  chlorhexidine 2% Cloths 1 Application(s) Topical once  cholecalciferol 1000 Unit(s) Oral daily  enoxaparin Injectable 40 milliGRAM(s) SubCutaneous every 24 hours  furosemide    Tablet 40 milliGRAM(s) Oral daily  influenza   Vaccine 0.5 milliLiter(s) IntraMuscular once  methylPREDNISolone sodium succinate Injectable 40 milliGRAM(s) IV Push every 6 hours  pregabalin 50 milliGRAM(s) Oral three times a day  simvastatin 20 milliGRAM(s) Oral at bedtime    MEDICATIONS  (PRN):  ALBUTerol    0.083% 2.5 milliGRAM(s) Nebulizer every 2 hours PRN COPD  guaiFENesin    Syrup 200 milliGRAM(s) Oral every 6 hours PRN Cough/congestion      Allergies    No Known Allergies    Intolerances    adhesives (Blisters)      SOCIAL HISTORY:    Smoking Status:former smoker  Alcohol:deied  Marital Status:  Occupation:Retired     FAMILY HISTORY:  Family history of acute myocardial infarction (Sibling)  Family history of stroke    	  	    	        Vital Signs Last 24 Hrs  T(C): 36.4 (01 Dec 2018 07:33), Max: 36.6 (30 Nov 2018 17:19)  T(F): 97.5 (01 Dec 2018 07:33), Max: 97.8 (30 Nov 2018 17:19)  HR: 74 (01 Dec 2018 07:33) (69 - 92)  BP: 122/60 (01 Dec 2018 07:33) (107/56 - 122/75)  BP(mean): --  RR: 20 (01 Dec 2018 07:33) (18 - 22)  SpO2: 99% (01 Dec 2018 07:33) (89% - 99%)    PHYSICAL EXAM:      Constitutional:fatigued    Eyes:anicteric    ENMT:no lesion    Neck:no adenopathy            Respiratory:diffusely decreased BS    Cardiovascular:RRR normal S1S2    Gastrointestinal:soft, non-tender, with pos BS          Extremities:no edema, pos clubbing                LABS:                                       12.5   9.5   )-----------( 203      ( 02 Dec 2018 07:00 )             39.8

## 2018-12-02 NOTE — PROGRESS NOTE ADULT - ASSESSMENT
Patient is a 72 year old male with PMH COPD on home O2 prn, Lung Ca s/p RL lobectomy and currently on chemo (last tx 2 weeks ago), HTN, HLD presents with 1 month of progressively worsening SOB and cough being admitted for COPD exacerbation. Pt was started on IV antibiotics and steroids after clinical improvement and a couple episodes of diarrhea, antibiotics were d/c as per pulm and steroids were changed to PO. Pt states improvement but continues to have cough.     COPD Exacerbation- improving   No inciting event, negative RVP, negative legionella  Imaging with noted RLL nodular opacity that is larger with a negative PE ct chest and a confirmed RML nodule on ct chest   -c/w steroids, change to PO and observe   -d/c zithromax - had some diarrhea yesterday, no BM this am yet.   -d/c abx per pulm   -c/w nebs as needed   -Elevated BUN likely 2/2 steroids, will monitor   - requesting medication given by primary last time he had a severe cough - unsure of name but states that it helped significantly.     Lung CA ([facility])   S/p RL lobectomy and currently on chemo (last tx 2 weeks ago)  Onc f/up noted    Hypertension   continue home medication  Atenolol 50mg PO daily    Chronic Lymphedema  c/w Lasix 40mg PO daily   lower ext doppler  neg for dvt     normocytic anemia - likely 2/2 aocd   Will monitor     Hypercholesterolemia  Simvastatin 20mg daily.    Gout/ stable   Continue with home medication  Allopurinol 300mg daily    Preventive Measure  Lovenox 40mg QD    Prognosis noted. Plan d/w patient and daughter at bedside Patient is a 72 year old male with PMH COPD on home O2 prn, Lung Ca s/p RL lobectomy and currently on chemo (last tx 2 weeks ago), HTN, HLD presents with 1 month of progressively worsening SOB and cough being admitted for COPD exacerbation. Pt was started on IV antibiotics and steroids after clinical improvement and a couple episodes of diarrhea, antibiotics were d/c as per pulm and steroids were changed to PO. Pt states improvement but continues to have cough.     COPD Exacerbation- improving   No inciting event, negative RVP, negative legionella  Imaging with noted RLL nodular opacity that is larger with a negative PE ct chest and a confirmed RML nodule on ct chest   -c/w oral steroids and taper; elevated bun likely due to steroid intake   -d/c zithromax - had some diarrhea yesterday, no BM this am yet.   -d/c abx per pulm   -c/w nebs as needed   -Elevated BUN likely 2/2 steroids, will monitor   - requesting medication given by primary last time he had a severe cough - unsure of name but states that it helped significantly.     Lung CA ([facility])   S/p RL lobectomy and currently on chemo (last tx 2 weeks ago)  Onc f/up noted    Hypertension   c/w atenolol    Chronic Lymphedema  c/w Lasix 40mg PO daily   lower ext doppler  neg for dvt     normocytic anemia - likely 2/2 aocd   Will monitor     Hypercholesterolemia  Simvastatin 20mg daily.    Gout/ stable   Continue with home medication  Allopurinol 300mg daily    Preventive Measure  Lovenox 40mg QD    Plan d/w patient and daughter Clary over the phone

## 2018-12-02 NOTE — PROGRESS NOTE ADULT - ASSESSMENT
Imp:  Squamous cell lung carcinoma; post chemo with interval resolution of previous hypermetabolic portacaval LN and stable right lung nodule  Admitted with COPD exacerbation-management per Pulmonary - improved with steroids and nasal O2    management as per pulmonary   Additional chemo as outpt  pt continues steroids; off Abx   d/c planning

## 2018-12-02 NOTE — PROGRESS NOTE ADULT - SUBJECTIVE AND OBJECTIVE BOX
cc: sob     INTERVAL/OVERNIGHT EVENTS  Patient seen and examined at bedside. No acute distress and no acute overnight events. Pt states that he had lots of cough over night.     ROS: No chest pain, palpitations, light headedness/dizziness, difficulty breathing/cough, fevers/chills, abdominal pain, n/v, diarrhea/constipation, dysuria or increased urinary frequency. All other ROS negative     Vital Signs Last 24 Hrs  T(C): 36.6 (02 Dec 2018 05:01), Max: 36.6 (01 Dec 2018 15:33)  T(F): 97.8 (02 Dec 2018 05:01), Max: 97.8 (01 Dec 2018 15:33)  HR: 75 (02 Dec 2018 05:01) (55 - 92)  BP: 124/68 (02 Dec 2018 05:01) (106/58 - 131/70)  BP(mean): --  RR: 20 (02 Dec 2018 05:01) (20 - 20)  SpO2: 99% (02 Dec 2018 05:01) (93% - 100%)    Exam:   GENERAL: No acute distress, well-developed, obese male on supplemental O2.  HEENT: Normocephalic, atraumatic; EOMI, PERRLA, moist mucous membranes.  NECK: Supple, No JVD, Right MediPort   RESP: no use of accessory muscles, no retractions, diffuse mild expiratory wheezing noted bilaterally  CARDIO: Regular rate and rhythm; No murmurs, rubs, or gallops  ABDOMEN: Soft, Nontender, Obese; Bowel sounds present  EXTREMITIES: No clubbing, cyanosis, or edema. 2+BL pitting edema.  NEUROLOGY: Alert and oriented x3, no focal neurological deficits.  SKIN: No rashes or lesions    LABS:                         12.8   9.8   )-----------( 203      ( 01 Dec 2018 06:45 )             39.9     12-02    140  |  104  |  29.0<H>  ----------------------------<  84  4.3   |  26.0  |  0.86    Ca    8.7      02 Dec 2018 07:00  Phos  3.4     12-01  Mg     2.4     12-01    TPro  5.5<L>  /  Alb  3.4  /  TBili  0.5  /  DBili  x   /  AST  24  /  ALT  22  /  AlkPhos  66  12-02    MEDICATIONS  (STANDING):  ALBUTerol/ipratropium for Nebulization 3 milliLiter(s) Nebulizer every 4 hours  allopurinol 300 milliGRAM(s) Oral daily  aspirin  chewable 81 milliGRAM(s) Oral daily  ATENolol  Tablet 50 milliGRAM(s) Oral daily  chlorhexidine 2% Cloths 1 Application(s) Topical once  cholecalciferol 1000 Unit(s) Oral daily  enoxaparin Injectable 40 milliGRAM(s) SubCutaneous every 24 hours  furosemide    Tablet 40 milliGRAM(s) Oral daily  influenza   Vaccine 0.5 milliLiter(s) IntraMuscular once  predniSONE   Tablet 40 milliGRAM(s) Oral daily  pregabalin 50 milliGRAM(s) Oral three times a day  simvastatin 20 milliGRAM(s) Oral at bedtime    MEDICATIONS  (PRN):  ALBUTerol    0.083% 2.5 milliGRAM(s) Nebulizer every 2 hours PRN COPD  guaiFENesin    Syrup 200 milliGRAM(s) Oral every 6 hours PRN Cough/congestion cc: sob     INTERVAL/OVERNIGHT EVENTS  Patient seen and examined at bedside. No acute distress and no acute overnight events. Pt states that he had lots of cough over night.     ROS: No chest pain, palpitations, light headedness/dizziness, difficulty breathing/cough, fevers/chills, abdominal pain, n/v, diarrhea/constipation, dysuria or increased urinary frequency. All other ROS negative     Vital Signs Last 24 Hrs  T(C): 36.6 (02 Dec 2018 05:01), Max: 36.6 (01 Dec 2018 15:33)  T(F): 97.8 (02 Dec 2018 05:01), Max: 97.8 (01 Dec 2018 15:33)  HR: 75 (02 Dec 2018 05:01) (55 - 92)  BP: 124/68 (02 Dec 2018 05:01) (106/58 - 131/70)  RR: 20 (02 Dec 2018 05:01) (20 - 20)  SpO2: 99% (02 Dec 2018 05:01) (93% - 100%)    Exam:   GENERAL: No acute distress, well-developed, obese male on supplemental O2.  HEENT: Normocephalic, atraumatic; EOMI, PERRLA, moist mucous membranes.  NECK: Supple, No JVD, Right MediPort   RESP: fair bilateral air entry in all lung fields with scattered mild expiratory wheezing ntoed   CARDIO: Regular rate and rhythm; No murmurs, rubs, or gallops  ABDOMEN: Soft, Nontender, Obese; Bowel sounds present  EXTREMITIES: No clubbing, cyanosis, or edema. 2+BL pitting edema.  NEUROLOGY: Alert and oriented x3, no focal neurological deficits.  SKIN: No rashes or lesions    LABS:                         12.8   9.8   )-----------( 203      ( 01 Dec 2018 06:45 )             39.9     12-02    140  |  104  |  29.0<H>  ----------------------------<  84  4.3   |  26.0  |  0.86    Ca    8.7      02 Dec 2018 07:00  Phos  3.4     12-01  Mg     2.4     12-01    TPro  5.5<L>  /  Alb  3.4  /  TBili  0.5  /  DBili  x   /  AST  24  /  ALT  22  /  AlkPhos  66  12-02    MEDICATIONS  (PRN):  ALBUTerol    0.083% 2.5 milliGRAM(s) Nebulizer every 2 hours PRN COPD  guaiFENesin    Syrup 200 milliGRAM(s) Oral every 6 hours PRN Cough/congestion    MEDICATIONS  (STANDING):  ALBUTerol/ipratropium for Nebulization 3 milliLiter(s) Nebulizer every 4 hours  allopurinol 300 milliGRAM(s) Oral daily  aspirin  chewable 81 milliGRAM(s) Oral daily  ATENolol  Tablet 50 milliGRAM(s) Oral daily  cholecalciferol 1000 Unit(s) Oral daily  enoxaparin Injectable 40 milliGRAM(s) SubCutaneous every 24 hours  furosemide    Tablet 40 milliGRAM(s) Oral daily  predniSONE   Tablet 40 milliGRAM(s) Oral daily  pregabalin 50 milliGRAM(s) Oral three times a day  simvastatin 20 milliGRAM(s) Oral at bedtime

## 2018-12-03 ENCOUNTER — INBOUND DOCUMENT (OUTPATIENT)
Age: 73
End: 2018-12-03

## 2018-12-03 VITALS
DIASTOLIC BLOOD PRESSURE: 80 MMHG | SYSTOLIC BLOOD PRESSURE: 130 MMHG | TEMPERATURE: 98 F | RESPIRATION RATE: 20 BRPM | HEART RATE: 72 BPM

## 2018-12-03 PROCEDURE — 87633 RESP VIRUS 12-25 TARGETS: CPT

## 2018-12-03 PROCEDURE — 87449 NOS EACH ORGANISM AG IA: CPT

## 2018-12-03 PROCEDURE — 94640 AIRWAY INHALATION TREATMENT: CPT

## 2018-12-03 PROCEDURE — 87581 M.PNEUMON DNA AMP PROBE: CPT

## 2018-12-03 PROCEDURE — 85610 PROTHROMBIN TIME: CPT

## 2018-12-03 PROCEDURE — 83880 ASSAY OF NATRIURETIC PEPTIDE: CPT

## 2018-12-03 PROCEDURE — 93306 TTE W/DOPPLER COMPLETE: CPT

## 2018-12-03 PROCEDURE — 87486 CHLMYD PNEUM DNA AMP PROBE: CPT

## 2018-12-03 PROCEDURE — 71045 X-RAY EXAM CHEST 1 VIEW: CPT

## 2018-12-03 PROCEDURE — 87070 CULTURE OTHR SPECIMN AEROBIC: CPT

## 2018-12-03 PROCEDURE — 84484 ASSAY OF TROPONIN QUANT: CPT

## 2018-12-03 PROCEDURE — 71275 CT ANGIOGRAPHY CHEST: CPT

## 2018-12-03 PROCEDURE — 83735 ASSAY OF MAGNESIUM: CPT

## 2018-12-03 PROCEDURE — 93005 ELECTROCARDIOGRAM TRACING: CPT

## 2018-12-03 PROCEDURE — 87798 DETECT AGENT NOS DNA AMP: CPT

## 2018-12-03 PROCEDURE — 80053 COMPREHEN METABOLIC PANEL: CPT

## 2018-12-03 PROCEDURE — 99232 SBSQ HOSP IP/OBS MODERATE 35: CPT | Mod: GC

## 2018-12-03 PROCEDURE — 93970 EXTREMITY STUDY: CPT

## 2018-12-03 PROCEDURE — 85027 COMPLETE CBC AUTOMATED: CPT

## 2018-12-03 PROCEDURE — 96374 THER/PROPH/DIAG INJ IV PUSH: CPT | Mod: XU

## 2018-12-03 PROCEDURE — 36415 COLL VENOUS BLD VENIPUNCTURE: CPT

## 2018-12-03 PROCEDURE — 85730 THROMBOPLASTIN TIME PARTIAL: CPT

## 2018-12-03 PROCEDURE — 84100 ASSAY OF PHOSPHORUS: CPT

## 2018-12-03 PROCEDURE — 99285 EMERGENCY DEPT VISIT HI MDM: CPT | Mod: 25

## 2018-12-03 PROCEDURE — 80048 BASIC METABOLIC PNL TOTAL CA: CPT

## 2018-12-03 RX ADMIN — Medication 200 MILLIGRAM(S): at 06:21

## 2018-12-03 RX ADMIN — Medication 1000 UNIT(S): at 11:10

## 2018-12-03 RX ADMIN — ATENOLOL 50 MILLIGRAM(S): 25 TABLET ORAL at 06:20

## 2018-12-03 RX ADMIN — Medication 3 MILLILITER(S): at 03:56

## 2018-12-03 RX ADMIN — Medication 3 MILLILITER(S): at 00:20

## 2018-12-03 RX ADMIN — Medication 81 MILLIGRAM(S): at 11:10

## 2018-12-03 RX ADMIN — Medication 40 MILLIGRAM(S): at 06:20

## 2018-12-03 RX ADMIN — Medication 50 MILLIGRAM(S): at 06:20

## 2018-12-03 RX ADMIN — Medication 300 MILLIGRAM(S): at 11:10

## 2018-12-03 RX ADMIN — ENOXAPARIN SODIUM 40 MILLIGRAM(S): 100 INJECTION SUBCUTANEOUS at 06:21

## 2018-12-03 NOTE — PROGRESS NOTE ADULT - SUBJECTIVE AND OBJECTIVE BOX
CC: Patient is a 72y old  Male who presents with a chief complaint of Shortness of breath and cough. (02 Dec 2018 13:53)    Interval History/Overnight Events:  Patient seen and examined at bedside, No acute overnight events.   Patient ambulating, eating well, voiding and last BM yesterday.   reviewed: Intermittent desat to 80-85% while sleeping.     This morning, patient says that he did not leave last night because his daughter who was to provide O2 was unable to make it to hospital. Says that he is fine, wife to bring O2 today. Complains of intermittent non-productive cough. Says that he feels that he is much better than when initially presented to hospital.     ROS: Denies fever, chest pain, SOB, abdominal pain, diarrhea, constipation, calf pain.    VS:   Vital Signs Last 24 Hrs  T(C): 36.4 (02 Dec 2018 23:53), Max: 36.5 (02 Dec 2018 09:07)  T(F): 97.6 (02 Dec 2018 23:53), Max: 97.7 (02 Dec 2018 09:07)  HR: 62 (03 Dec 2018 06:16) (59 - 73)  BP: 126/69 (03 Dec 2018 06:16) (115/79 - 132/83)  BP(mean): --  RR: 19 (02 Dec 2018 23:53) (19 - 22)  SpO2: 98% (03 Dec 2018 03:57) (93% - 98%)    Physical Exam:   Gen: adult male, seated in bed, nasal canula in place, NAD  HEENT: NCAT, EOMI, PERRLA, Pupils reactive 5mm to 4mm. Moist oral mucosa.   CVS: RRR, +S1/S2, no murmurs, rubs or gallops appreciated  Pulm: decreased air movement in b/l bases, without any wheezes or rhonchi.   GI: +BS, obese, soft, ND, NT.   Ext: No cyanosis. +b/l 1+ pitting edema. No calf tenderness. 2+ b/l dorsalis pedal pulses  Neuro: AAOx3, no focal deficits.  Skin: venous stasis dermatitis in RLE.     Labs:                        12.5   9.5   )-----------( 203      ( 02 Dec 2018 07:00 )             39.8   12-02    140  |  104  |  29.0<H>  ----------------------------<  84  4.3   |  26.0  |  0.86    Ca    8.7      02 Dec 2018 07:00    TPro  5.5<L>  /  Alb  3.4  /  TBili  0.5  /  DBili  x   /  AST  24  /  ALT  22  /  AlkPhos  66  12-02        Radiology:  No new studies    Medications:  MEDICATIONS  (STANDING):  ALBUTerol/ipratropium for Nebulization 3 milliLiter(s) Nebulizer every 4 hours  allopurinol 300 milliGRAM(s) Oral daily  aspirin  chewable 81 milliGRAM(s) Oral daily  ATENolol  Tablet 50 milliGRAM(s) Oral daily  cholecalciferol 1000 Unit(s) Oral daily  enoxaparin Injectable 40 milliGRAM(s) SubCutaneous every 24 hours  furosemide    Tablet 40 milliGRAM(s) Oral daily  predniSONE   Tablet 40 milliGRAM(s) Oral daily  pregabalin 50 milliGRAM(s) Oral three times a day  simvastatin 20 milliGRAM(s) Oral at bedtime    MEDICATIONS  (PRN):  ALBUTerol    0.083% 2.5 milliGRAM(s) Nebulizer every 2 hours PRN COPD  guaiFENesin    Syrup 200 milliGRAM(s) Oral every 6 hours PRN Cough/congestion CC: Patient is a 72y old  Male who presents with a chief complaint of Shortness of breath and cough. (02 Dec 2018 13:53)    Interval History/Overnight Events:  Patient seen and examined at bedside, No acute overnight events.   Patient ambulating, eating well, voiding and last BM yesterday.   reviewed: Intermittent desat to 80-85% while sleeping.     This morning, patient says that he did not leave last night because his daughter who was to provide O2 was unable to make it to hospital. Says that he is fine, wife to bring O2 today. Complains of intermittent non-productive cough. Says that he feels that he is much better than when initially presented to hospital.     ROS: Denies fever, chest pain, SOB, abdominal pain, diarrhea, constipation, calf pain. All other ROS negative     VS:   Vital Signs Last 24 Hrs  T(C): 36.4 (02 Dec 2018 23:53), Max: 36.5 (02 Dec 2018 09:07)  T(F): 97.6 (02 Dec 2018 23:53), Max: 97.7 (02 Dec 2018 09:07)  HR: 62 (03 Dec 2018 06:16) (59 - 73)  BP: 126/69 (03 Dec 2018 06:16) (115/79 - 132/83)  RR: 19 (02 Dec 2018 23:53) (19 - 22)  SpO2: 98% (03 Dec 2018 03:57) (93% - 98%)    Physical Exam:   Gen: adult male, seated in bed, nasal canula in place, NAD  HEENT: NCAT, EOMI, PERRLA, Pupils reactive 5mm to 4mm. Moist oral mucosa.   CVS: RRR, +S1/S2, no murmurs, rubs or gallops appreciated  Pulm: decreased air movement in b/l bases, without any wheezes or rhonchi.   GI: +BS, obese, soft, ND, NT.   Ext: No cyanosis. +b/l 1+ pitting edema. No calf tenderness. 2+ b/l dorsalis pedal pulses  Neuro: AAOx3, no focal deficits.  Skin: venous stasis dermatitis in RLE.     Labs:                        12.5   9.5   )-----------( 203      ( 02 Dec 2018 07:00 )             39.8   12-02    140  |  104  |  29.0<H>  ----------------------------<  84  4.3   |  26.0  |  0.86    Ca    8.7      02 Dec 2018 07:00    TPro  5.5<L>  /  Alb  3.4  /  TBili  0.5  /  DBili  x   /  AST  24  /  ALT  22  /  AlkPhos  66  12-02        Radiology:  No new studies    Medications:  MEDICATIONS  (STANDING):  ALBUTerol/ipratropium for Nebulization 3 milliLiter(s) Nebulizer every 4 hours  allopurinol 300 milliGRAM(s) Oral daily  aspirin  chewable 81 milliGRAM(s) Oral daily  ATENolol  Tablet 50 milliGRAM(s) Oral daily  cholecalciferol 1000 Unit(s) Oral daily  enoxaparin Injectable 40 milliGRAM(s) SubCutaneous every 24 hours  furosemide    Tablet 40 milliGRAM(s) Oral daily  predniSONE   Tablet 40 milliGRAM(s) Oral daily  pregabalin 50 milliGRAM(s) Oral three times a day  simvastatin 20 milliGRAM(s) Oral at bedtime    MEDICATIONS  (PRN):  ALBUTerol    0.083% 2.5 milliGRAM(s) Nebulizer every 2 hours PRN COPD  guaiFENesin    Syrup 200 milliGRAM(s) Oral every 6 hours PRN Cough/congestion

## 2018-12-03 NOTE — PROGRESS NOTE ADULT - ASSESSMENT
Patient is a 72 year old male with PMH COPD on home O2 prn, Lung Ca s/p RL lobectomy and currently on chemo (last tx 2 weeks ago), HTN, HLD presents with 1 month of progressively worsening SOB and cough being admitted for COPD exacerbation. Pt was started on IV antibiotics and steroids after clinical improvement and a couple episodes of diarrhea, antibiotics were d/c as per pulm and steroids were changed to PO. Pt states improvement but continues to have cough. Plan to discharge home today.     COPD Exacerbation- improving   No inciting event, negative RVP, negative legionella  Imaging with noted RLL nodular opacity that is larger with a negative PE ct chest and a confirmed RML nodule on ct chest   -c/w oral steroids and taper; elevated bun likely due to steroid intake, will monitor    -d/c zithromax - had some diarrhea yesterday, no BM this am yet.   -d/c abx per pulm   -c/w nebs as needed   - requesting medication given by primary last time he had a severe cough - unsure of name but states that it helped significantly.     Lung CA ([facility])   S/p RL lobectomy and currently on chemo (last tx 2 weeks ago)  Onc f/up noted    Hypertension   c/w atenolol    Chronic Lymphedema  c/w Lasix 40mg PO daily   lower ext doppler  neg for dvt     normocytic anemia - likely 2/2 aocd   Will monitor     Hypercholesterolemia  Simvastatin 20mg daily.    Gout/ stable   Continue with home medication  Allopurinol 300mg daily    Preventive Measure  Lovenox 40mg QD    Plan to dc home today. Unable to leave yesterday due to family not able to provide O2 upon d/c yesterday. Patient is a 72 year old male with PMH COPD on home O2 prn, Lung Ca s/p RL lobectomy and currently on chemo (last tx 2 weeks ago), HTN, HLD presents with 1 month of progressively worsening SOB and cough being admitted for COPD exacerbation. Pt was started on IV antibiotics and steroids after clinical improvement and a couple episodes of diarrhea, antibiotics were d/c as per pulm and steroids were changed to PO. Pt states improvement but continues to have cough. Plan to discharge home today.     COPD Exacerbation- improving   No inciting event, negative RVP, negative legionella  Imaging with noted RLL nodular opacity that is larger with a negative PE ct chest and a confirmed RML nodule on ct chest   -c/w oral steroids and taper; elevated bun likely due to steroid intake  -off zithro due to diarrhea. Sputum cx noted, improved off abx - no further tx   -c/w nebs as needed   -cough suppressants for sleep only     Lung CA ([facility])   S/p RL lobectomy and currently on chemo (last tx 2 weeks ago)  Onc f/up noted    Hypertension   c/w atenolol    Chronic Lymphedema  c/w Lasix 40mg PO daily   lower ext doppler  neg for dvt     normocytic anemia - likely 2/2 aocd   Will monitor     Hypercholesterolemia  Simvastatin 20mg daily.    Gout/ stable   Continue with home medication  Allopurinol 300mg daily    Preventive Measure  Lovenox 40mg QD    Plan to dc home today. Unable to leave yesterday due to family not able to provide O2 upon d/c yesterday.

## 2018-12-03 NOTE — PROGRESS NOTE ADULT - ATTENDING COMMENTS
Note addended where needed. Plan discussed with patient at bedside with daughter. Prognosis guarded
Note addended where needed. Plan discussed with patient at bedside. Informed by staff that going home in his truck with portable O2 is against hospital policy however, he stated that he was going to do it anyway.   Please see discharge papers for details.
Note addended where needed. Plan discussed with patient at bedside & daughter. Plan discharge to home today.

## 2018-12-04 LAB
CULTURE RESULTS: SIGNIFICANT CHANGE UP
SPECIMEN SOURCE: SIGNIFICANT CHANGE UP

## 2018-12-31 ENCOUNTER — RX RENEWAL (OUTPATIENT)
Age: 73
End: 2018-12-31

## 2019-01-01 NOTE — ASU PREOP CHECKLIST - WARM FLUIDS/WARM BLANKETS

## 2019-01-02 ENCOUNTER — APPOINTMENT (OUTPATIENT)
Dept: PULMONOLOGY | Facility: CLINIC | Age: 74
End: 2019-01-02
Payer: MEDICARE

## 2019-01-02 VITALS
BODY MASS INDEX: 41.32 KG/M2 | HEART RATE: 82 BPM | DIASTOLIC BLOOD PRESSURE: 70 MMHG | OXYGEN SATURATION: 93 % | SYSTOLIC BLOOD PRESSURE: 118 MMHG | WEIGHT: 256 LBS

## 2019-01-02 PROCEDURE — 99215 OFFICE O/P EST HI 40 MIN: CPT

## 2019-01-02 RX ORDER — ACLIDINIUM BROMIDE 400 UG/1
400 POWDER, METERED RESPIRATORY (INHALATION) TWICE DAILY
Qty: 3 | Refills: 3 | Status: DISCONTINUED | COMMUNITY
Start: 2017-12-20 | End: 2019-01-02

## 2019-01-02 RX ORDER — CEFUROXIME AXETIL 500 MG/1
500 TABLET ORAL
Qty: 10 | Refills: 0 | Status: DISCONTINUED | COMMUNITY
Start: 2018-11-29 | End: 2019-01-02

## 2019-01-02 RX ORDER — PREDNISONE 5 MG/1
5 TABLET ORAL TWICE DAILY
Qty: 60 | Refills: 2 | Status: DISCONTINUED | COMMUNITY
Start: 2018-02-02 | End: 2019-01-02

## 2019-01-02 RX ORDER — FLUTICASONE PROPIONATE AND SALMETEROL 50; 250 UG/1; UG/1
250-50 POWDER RESPIRATORY (INHALATION)
Qty: 3 | Refills: 1 | Status: DISCONTINUED | COMMUNITY
Start: 2017-06-28 | End: 2019-01-02

## 2019-01-02 RX ORDER — CEFUROXIME AXETIL 500 MG/1
500 TABLET ORAL
Qty: 10 | Refills: 3 | Status: DISCONTINUED | COMMUNITY
Start: 2018-06-28 | End: 2019-01-02

## 2019-01-24 LAB
A PHAGOCYTOPH IGG TITR SER IF: NORMAL TITER
ALBUMIN SERPL ELPH-MCNC: 4.3 G/DL
ALP BLD-CCNC: 81 U/L
ALT SERPL-CCNC: 28 U/L
ANA SER IF-ACNC: NEGATIVE
ANION GAP SERPL CALC-SCNC: 16 MMOL/L
APPEARANCE: CLEAR
AST SERPL-CCNC: 25 U/L
B BURGDOR AB SER QL IA: NEGATIVE
B BURGDOR AB SER-IMP: NEGATIVE
B BURGDOR IGM PATRN SER IB-IMP: NEGATIVE
B BURGDOR18/20KD IGM SER QL IB: NORMAL
B BURGDOR18KD IGG SER QL IB: NORMAL
B BURGDOR23KD IGG SER QL IB: NORMAL
B BURGDOR23KD IGM SER QL IB: NORMAL
B BURGDOR28KD AB SER QL IB: NORMAL
B BURGDOR28KD IGG SER QL IB: NORMAL
B BURGDOR30KD AB SER QL IB: NORMAL
B BURGDOR30KD IGG SER QL IB: NORMAL
B BURGDOR31KD IGG SER QL IB: NORMAL
B BURGDOR31KD IGM SER QL IB: NORMAL
B BURGDOR39KD IGG SER QL IB: NORMAL
B BURGDOR39KD IGM SER QL IB: NORMAL
B BURGDOR41KD IGG SER QL IB: PRESENT
B BURGDOR41KD IGM SER QL IB: NORMAL
B BURGDOR45KD AB SER QL IB: NORMAL
B BURGDOR45KD IGG SER QL IB: NORMAL
B BURGDOR58KD AB SER QL IB: NORMAL
B BURGDOR58KD IGG SER QL IB: NORMAL
B BURGDOR66KD IGG SER QL IB: NORMAL
B BURGDOR66KD IGM SER QL IB: NORMAL
B BURGDOR93KD IGG SER QL IB: NORMAL
B BURGDOR93KD IGM SER QL IB: NORMAL
B MICROTI IGG TITR SER: NORMAL TITER
BACTERIA: NEGATIVE
BASOPHILS # BLD AUTO: 0.02 K/UL
BASOPHILS NFR BLD AUTO: 0.2 %
BILIRUB SERPL-MCNC: 0.4 MG/DL
BILIRUBIN URINE: NEGATIVE
BLOOD URINE: NEGATIVE
BUN SERPL-MCNC: 21 MG/DL
CALCIUM SERPL-MCNC: 9.7 MG/DL
CCP AB SER IA-ACNC: <8 UNITS
CENTROMERE IGG SER-ACNC: <0.2 AL
CHLORIDE SERPL-SCNC: 98 MMOL/L
CHROMATIN AB SERPL-ACNC: <0.2 AL
CK SERPL-CCNC: 65 U/L
CO2 SERPL-SCNC: 27 MMOL/L
COLOR: YELLOW
CREAT SERPL-MCNC: 1.01 MG/DL
CRP SERPL-MCNC: 0.6 MG/DL
DEPRECATED KAPPA LC FREE/LAMBDA SER: 0.81 RATIO
DSDNA AB SER-ACNC: <12 IU/ML
E CHAFFEENSIS IGG TITR SER IF: NORMAL TITER
ENA RNP AB SER IA-ACNC: <0.2 AL
ENA SCL70 IGG SER IA-ACNC: <0.2 AL
ENA SM AB SER IA-ACNC: <0.2 AL
ENA SS-A AB SER IA-ACNC: <0.2 AL
ENA SS-B AB SER IA-ACNC: <0.2 AL
EOSINOPHIL # BLD AUTO: 0.09 K/UL
EOSINOPHIL NFR BLD AUTO: 0.8 %
ERYTHROCYTE [SEDIMENTATION RATE] IN BLOOD BY WESTERGREN METHOD: 17 MM/HR
GLUCOSE QUALITATIVE U: NEGATIVE MG/DL
GLUCOSE SERPL-MCNC: 84 MG/DL
HCT VFR BLD CALC: 49.6 %
HGB BLD-MCNC: 16.2 G/DL
IGA SER QL IEP: 95 MG/DL
IGG SER QL IEP: 786 MG/DL
IGM SER QL IEP: 72 MG/DL
IMM GRANULOCYTES NFR BLD AUTO: 0.5 %
KAPPA LC CSF-MCNC: 1.37 MG/DL
KAPPA LC SERPL-MCNC: 1.11 MG/DL
KETONES URINE: NEGATIVE
LEUKOCYTE ESTERASE URINE: NEGATIVE
LYMPHOCYTES # BLD AUTO: 1.49 K/UL
LYMPHOCYTES NFR BLD AUTO: 14.1 %
MAN DIFF?: NORMAL
MCHC RBC-ENTMCNC: 28.5 PG
MCHC RBC-ENTMCNC: 32.7 GM/DL
MCV RBC AUTO: 87.2 FL
MICROSCOPIC-UA: NORMAL
MONOCYTES # BLD AUTO: 0.84 K/UL
MONOCYTES NFR BLD AUTO: 7.9 %
NEUTROPHILS # BLD AUTO: 8.11 K/UL
NEUTROPHILS NFR BLD AUTO: 76.5 %
NITRITE URINE: NEGATIVE
PH URINE: 6.5
PLATELET # BLD AUTO: 182 K/UL
POTASSIUM SERPL-SCNC: 4.3 MMOL/L
PROT SERPL-MCNC: 6.6 G/DL
PROTEIN URINE: NEGATIVE MG/DL
RBC # BLD: 5.69 M/UL
RBC # FLD: 15 %
RED BLOOD CELLS URINE: 0 /HPF
RF+CCP IGG SER-IMP: NEGATIVE
RHEUMATOID FACT SER QL: 9 IU/ML
RIBOSOMAL P AB SER IA-ACNC: <0.2 AL
RNA POLYMERASE III IGG: <10 U
SODIUM SERPL-SCNC: 141 MMOL/L
SPECIFIC GRAVITY URINE: 1.01
SQUAMOUS EPITHELIAL CELLS: 0 /HPF
SSDNA AB SER-ACNC: 13 U/ML
THYROGLOB AB SERPL-ACNC: <20 IU/ML
THYROPEROXIDASE AB SERPL IA-ACNC: <10 IU/ML
URATE SERPL-MCNC: 6.5 MG/DL
UROBILINOGEN URINE: NEGATIVE MG/DL
WBC # FLD AUTO: 10.6 K/UL
WHITE BLOOD CELLS URINE: 0 /HPF

## 2019-02-06 ENCOUNTER — APPOINTMENT (OUTPATIENT)
Dept: NUCLEAR MEDICINE | Facility: CLINIC | Age: 74
End: 2019-02-06
Payer: MEDICARE

## 2019-02-06 ENCOUNTER — OUTPATIENT (OUTPATIENT)
Dept: OUTPATIENT SERVICES | Facility: HOSPITAL | Age: 74
LOS: 1 days | End: 2019-02-06

## 2019-02-06 DIAGNOSIS — Z00.8 ENCOUNTER FOR OTHER GENERAL EXAMINATION: ICD-10-CM

## 2019-02-06 DIAGNOSIS — Z98.89 OTHER SPECIFIED POSTPROCEDURAL STATES: Chronic | ICD-10-CM

## 2019-02-06 DIAGNOSIS — Z85.118 PERSONAL HISTORY OF OTHER MALIGNANT NEOPLASM OF BRONCHUS AND LUNG: Chronic | ICD-10-CM

## 2019-02-06 PROCEDURE — 78815 PET IMAGE W/CT SKULL-THIGH: CPT | Mod: 26,PS

## 2019-02-28 ENCOUNTER — OUTPATIENT (OUTPATIENT)
Dept: OUTPATIENT SERVICES | Facility: HOSPITAL | Age: 74
LOS: 1 days | End: 2019-02-28

## 2019-02-28 ENCOUNTER — APPOINTMENT (OUTPATIENT)
Dept: MRI IMAGING | Facility: CLINIC | Age: 74
End: 2019-02-28
Payer: MEDICARE

## 2019-02-28 DIAGNOSIS — C34.2 MALIGNANT NEOPLASM OF MIDDLE LOBE, BRONCHUS OR LUNG: ICD-10-CM

## 2019-02-28 DIAGNOSIS — Z98.89 OTHER SPECIFIED POSTPROCEDURAL STATES: Chronic | ICD-10-CM

## 2019-02-28 DIAGNOSIS — Z85.118 PERSONAL HISTORY OF OTHER MALIGNANT NEOPLASM OF BRONCHUS AND LUNG: Chronic | ICD-10-CM

## 2019-02-28 PROCEDURE — 70553 MRI BRAIN STEM W/O & W/DYE: CPT | Mod: 26

## 2019-03-11 ENCOUNTER — EMERGENCY (EMERGENCY)
Facility: HOSPITAL | Age: 74
LOS: 1 days | Discharge: HOME | End: 2019-03-11
Attending: EMERGENCY MEDICINE
Payer: COMMERCIAL

## 2019-03-11 ENCOUNTER — APPOINTMENT (OUTPATIENT)
Dept: PULMONOLOGY | Facility: CLINIC | Age: 74
End: 2019-03-11
Payer: MEDICARE

## 2019-03-11 VITALS — BODY MASS INDEX: 40.84 KG/M2 | WEIGHT: 253 LBS

## 2019-03-11 VITALS
HEART RATE: 86 BPM | TEMPERATURE: 97 F | SYSTOLIC BLOOD PRESSURE: 120 MMHG | DIASTOLIC BLOOD PRESSURE: 70 MMHG | RESPIRATION RATE: 22 BRPM | WEIGHT: 151.02 LBS | HEIGHT: 66 IN | OXYGEN SATURATION: 95 %

## 2019-03-11 VITALS — OXYGEN SATURATION: 96 % | HEART RATE: 92 BPM

## 2019-03-11 VITALS
TEMPERATURE: 99 F | RESPIRATION RATE: 18 BRPM | OXYGEN SATURATION: 99 % | HEART RATE: 78 BPM | SYSTOLIC BLOOD PRESSURE: 170 MMHG | DIASTOLIC BLOOD PRESSURE: 82 MMHG

## 2019-03-11 DIAGNOSIS — Z85.118 PERSONAL HISTORY OF OTHER MALIGNANT NEOPLASM OF BRONCHUS AND LUNG: Chronic | ICD-10-CM

## 2019-03-11 DIAGNOSIS — Z98.89 OTHER SPECIFIED POSTPROCEDURAL STATES: Chronic | ICD-10-CM

## 2019-03-11 DIAGNOSIS — C34.91 MALIGNANT NEOPLASM OF UNSPECIFIED PART OF RIGHT BRONCHUS OR LUNG: ICD-10-CM

## 2019-03-11 LAB
ALBUMIN SERPL ELPH-MCNC: 3.5 G/DL — SIGNIFICANT CHANGE UP (ref 3.3–5.2)
ALP SERPL-CCNC: 76 U/L — SIGNIFICANT CHANGE UP (ref 40–120)
ALT FLD-CCNC: 15 U/L — SIGNIFICANT CHANGE UP
ANION GAP SERPL CALC-SCNC: 13 MMOL/L — SIGNIFICANT CHANGE UP (ref 5–17)
APTT BLD: 30.7 SEC — SIGNIFICANT CHANGE UP (ref 27.5–36.3)
AST SERPL-CCNC: 20 U/L — SIGNIFICANT CHANGE UP
BASOPHILS # BLD AUTO: 0 K/UL — SIGNIFICANT CHANGE UP (ref 0–0.2)
BASOPHILS NFR BLD AUTO: 0.2 % — SIGNIFICANT CHANGE UP (ref 0–2)
BILIRUB SERPL-MCNC: 0.5 MG/DL — SIGNIFICANT CHANGE UP (ref 0.4–2)
BUN SERPL-MCNC: 15 MG/DL — SIGNIFICANT CHANGE UP (ref 8–20)
CALCIUM SERPL-MCNC: 8.5 MG/DL — LOW (ref 8.6–10.2)
CHLORIDE SERPL-SCNC: 110 MMOL/L — HIGH (ref 98–107)
CO2 SERPL-SCNC: 21 MMOL/L — LOW (ref 22–29)
CREAT SERPL-MCNC: 0.73 MG/DL — SIGNIFICANT CHANGE UP (ref 0.5–1.3)
EOSINOPHIL # BLD AUTO: 0.2 K/UL — SIGNIFICANT CHANGE UP (ref 0–0.5)
EOSINOPHIL NFR BLD AUTO: 3.5 % — SIGNIFICANT CHANGE UP (ref 0–6)
GLUCOSE SERPL-MCNC: 120 MG/DL — HIGH (ref 70–115)
HCT VFR BLD CALC: 40.1 % — LOW (ref 42–52)
HGB BLD-MCNC: 13.1 G/DL — LOW (ref 14–18)
INR BLD: 1.08 RATIO — SIGNIFICANT CHANGE UP (ref 0.88–1.16)
LACTATE BLDV-MCNC: 1.3 MMOL/L — SIGNIFICANT CHANGE UP (ref 0.5–2)
LYMPHOCYTES # BLD AUTO: 1.2 K/UL — SIGNIFICANT CHANGE UP (ref 1–4.8)
LYMPHOCYTES # BLD AUTO: 19.4 % — LOW (ref 20–55)
MCHC RBC-ENTMCNC: 28.3 PG — SIGNIFICANT CHANGE UP (ref 27–31)
MCHC RBC-ENTMCNC: 32.7 G/DL — SIGNIFICANT CHANGE UP (ref 32–36)
MCV RBC AUTO: 86.6 FL — SIGNIFICANT CHANGE UP (ref 80–94)
MONOCYTES # BLD AUTO: 0.6 K/UL — SIGNIFICANT CHANGE UP (ref 0–0.8)
MONOCYTES NFR BLD AUTO: 10.6 % — HIGH (ref 3–10)
NEUTROPHILS # BLD AUTO: 4 K/UL — SIGNIFICANT CHANGE UP (ref 1.8–8)
NEUTROPHILS NFR BLD AUTO: 66.1 % — SIGNIFICANT CHANGE UP (ref 37–73)
NT-PROBNP SERPL-SCNC: 143 PG/ML — SIGNIFICANT CHANGE UP (ref 0–300)
PLATELET # BLD AUTO: 185 K/UL — SIGNIFICANT CHANGE UP (ref 150–400)
POTASSIUM SERPL-MCNC: 3.6 MMOL/L — SIGNIFICANT CHANGE UP (ref 3.5–5.3)
POTASSIUM SERPL-SCNC: 3.6 MMOL/L — SIGNIFICANT CHANGE UP (ref 3.5–5.3)
PROT SERPL-MCNC: 5.9 G/DL — LOW (ref 6.6–8.7)
PROTHROM AB SERPL-ACNC: 12.4 SEC — SIGNIFICANT CHANGE UP (ref 10–12.9)
RBC # BLD: 4.63 M/UL — SIGNIFICANT CHANGE UP (ref 4.6–6.2)
RBC # FLD: 16.2 % — HIGH (ref 11–15.6)
SODIUM SERPL-SCNC: 144 MMOL/L — SIGNIFICANT CHANGE UP (ref 135–145)
WBC # BLD: 6 K/UL — SIGNIFICANT CHANGE UP (ref 4.8–10.8)
WBC # FLD AUTO: 6 K/UL — SIGNIFICANT CHANGE UP (ref 4.8–10.8)

## 2019-03-11 PROCEDURE — 85027 COMPLETE CBC AUTOMATED: CPT

## 2019-03-11 PROCEDURE — 83880 ASSAY OF NATRIURETIC PEPTIDE: CPT

## 2019-03-11 PROCEDURE — 71046 X-RAY EXAM CHEST 2 VIEWS: CPT | Mod: 26

## 2019-03-11 PROCEDURE — 36415 COLL VENOUS BLD VENIPUNCTURE: CPT

## 2019-03-11 PROCEDURE — 85730 THROMBOPLASTIN TIME PARTIAL: CPT

## 2019-03-11 PROCEDURE — 83605 ASSAY OF LACTIC ACID: CPT

## 2019-03-11 PROCEDURE — 84484 ASSAY OF TROPONIN QUANT: CPT

## 2019-03-11 PROCEDURE — 71046 X-RAY EXAM CHEST 2 VIEWS: CPT

## 2019-03-11 PROCEDURE — 93005 ELECTROCARDIOGRAM TRACING: CPT

## 2019-03-11 PROCEDURE — 71275 CT ANGIOGRAPHY CHEST: CPT | Mod: 26

## 2019-03-11 PROCEDURE — 82550 ASSAY OF CK (CPK): CPT

## 2019-03-11 PROCEDURE — 94640 AIRWAY INHALATION TREATMENT: CPT

## 2019-03-11 PROCEDURE — 96374 THER/PROPH/DIAG INJ IV PUSH: CPT | Mod: XU

## 2019-03-11 PROCEDURE — 99284 EMERGENCY DEPT VISIT MOD MDM: CPT | Mod: 25

## 2019-03-11 PROCEDURE — 99215 OFFICE O/P EST HI 40 MIN: CPT | Mod: 25

## 2019-03-11 PROCEDURE — 87040 BLOOD CULTURE FOR BACTERIA: CPT

## 2019-03-11 PROCEDURE — 93010 ELECTROCARDIOGRAM REPORT: CPT

## 2019-03-11 PROCEDURE — 94010 BREATHING CAPACITY TEST: CPT

## 2019-03-11 PROCEDURE — 99285 EMERGENCY DEPT VISIT HI MDM: CPT

## 2019-03-11 PROCEDURE — 85610 PROTHROMBIN TIME: CPT

## 2019-03-11 PROCEDURE — 80053 COMPREHEN METABOLIC PANEL: CPT

## 2019-03-11 PROCEDURE — 71275 CT ANGIOGRAPHY CHEST: CPT

## 2019-03-11 RX ORDER — HYDROCODONE BITARTRATE AND HOMATROPINE METHYLBROMIDE 5; 1.5 MG/5ML; MG/5ML
5-1.5 SYRUP ORAL
Refills: 0 | Status: DISCONTINUED | COMMUNITY
Start: 2019-03-11 | End: 2019-03-11

## 2019-03-11 RX ORDER — PROMETHAZINE HYDROCHLORIDE AND CODEINE PHOSPHATE 6.25; 1 MG/5ML; MG/5ML
SOLUTION ORAL
Refills: 0 | Status: DISCONTINUED | COMMUNITY
End: 2019-03-11

## 2019-03-11 RX ORDER — IPRATROPIUM/ALBUTEROL SULFATE 18-103MCG
3 AEROSOL WITH ADAPTER (GRAM) INHALATION ONCE
Qty: 0 | Refills: 0 | Status: COMPLETED | OUTPATIENT
Start: 2019-03-11 | End: 2019-03-11

## 2019-03-11 RX ORDER — DULOXETINE HYDROCHLORIDE 30 MG/1
30 CAPSULE, DELAYED RELEASE PELLETS ORAL
Qty: 30 | Refills: 2 | Status: ACTIVE | COMMUNITY
Start: 2018-02-22

## 2019-03-11 RX ADMIN — Medication 3 MILLILITER(S): at 18:51

## 2019-03-11 RX ADMIN — Medication 125 MILLIGRAM(S): at 18:51

## 2019-03-11 NOTE — ED STATDOCS - PROGRESS NOTE DETAILS
72 y/o M pt with hx of COPD, Lung CA, HTN presents to ED c/o worsening SOB ,dyspnea on exertion, nonproductive cough  for the past few days. Pt is currently on chemotherapy; saw Dr. Collazo on Friday was prescribed cough medication. Pt was seen by Dr. Rosas today and sent to ED for evaluation. He was admits for COPD exacerbation on November 29th 2018. Pt is on O2 2 liters at night; however finds to be more reliant on it recently.   Oncologist: Dr. Collazo  PE: Diminished breathe sounds, no wheezing 72 y/o M pt with hx of COPD, Lung CA, HTN presents to ED c/o worsening SOB ,dyspnea on exertion, nonproductive cough  for the past few days. Pt is currently on chemotherapy; saw Dr. Collazo on Friday was prescribed cough medication which he hasn't started. Pt was seen by Dr. Cooper today and sent to ED for evaluation. He was admits for COPD exacerbation on November 29th 2018. Pt is on O2 2 liters at night; however finds to be more reliant on it recently.   Oncologist: Dr. Collazo  PE: Diminished breathe sounds, no wheezing

## 2019-03-11 NOTE — ED ADULT NURSE NOTE - NSIMPLEMENTINTERV_GEN_ALL_ED
Implemented All Fall with Harm Risk Interventions:  Cable to call system. Call bell, personal items and telephone within reach. Instruct patient to call for assistance. Room bathroom lighting operational. Non-slip footwear when patient is off stretcher. Physically safe environment: no spills, clutter or unnecessary equipment. Stretcher in lowest position, wheels locked, appropriate side rails in place. Provide visual cue, wrist band, yellow gown, etc. Monitor gait and stability. Monitor for mental status changes and reorient to person, place, and time. Review medications for side effects contributing to fall risk. Reinforce activity limits and safety measures with patient and family. Provide visual clues: red socks.

## 2019-03-11 NOTE — ED PROVIDER NOTE - OBJECTIVE STATEMENT
This patient is a 73 year old man hx of HTN, COPD and lung cancer who presents to the ER c/o cough and OSULLIVAN.  Patient has been experiencing OSULLIVAN for the past 3.5 months and usually uses oxygen only at night but has felt the need to use it more continuously during the day.  He reports chronic leg swelling right greater than left.  He is followed by Dr. Collazo for cheomtherapy and was prescribed a cough medicine but has not yet picked it up from the pharmacy.  Patient states that he had a very small amount of promethazine from previous prescription which he took a couple days ago with a couple hours of relief.  He denies fever, chest pain, and sick contacts.

## 2019-03-11 NOTE — ED ADULT NURSE NOTE - OBJECTIVE STATEMENT
sent by pulmonary for r/o PE, has cancer  ( just had chemo on Friday) and COPD also. Home oxygen on 2 l Denies chills or fever , denies chest pain

## 2019-03-11 NOTE — ED PROVIDER NOTE - PROGRESS NOTE DETAILS
Patient states that he feels much improved and ready to be discharged.  Patient re-examined increase airflow on auscultation with minimal wheeze.  Patient speaking in full sentences.

## 2019-03-11 NOTE — ED ADULT NURSE REASSESSMENT NOTE - NS ED NURSE REASSESS COMMENT FT1
pt resting comfortably in bed denies SOB, questions answered offers no complaints at this time. no acute distress noted.

## 2019-03-16 LAB
CULTURE RESULTS: SIGNIFICANT CHANGE UP
CULTURE RESULTS: SIGNIFICANT CHANGE UP
SPECIMEN SOURCE: SIGNIFICANT CHANGE UP
SPECIMEN SOURCE: SIGNIFICANT CHANGE UP

## 2019-04-22 NOTE — DISCUSSION/SUMMARY
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, stage IA , recurrence by pathology  RML, post Radiation Therapy/Cyberknife, now on opdivo, \par off taxotere and gemzar, last CT/PET 2/19 reviewed, stable RML nodules, resolved SUV uptake portocaval node\par spirometry stable, exam without bronchospasm\par remains home O2 dependent, oxygenating well on POC 3 liters\par needs sleep study, has obesity, snoring and fatigue\par We'll continue with Advair and Tudorza, Neb\par Daliresp with food, discussed need for  weight loss \par Given worsening dyspnea, stable spirometry, exam without bronchospasm, no purulent sputum\par recent CXR and CT/PET reviewed, advised ER, r/o PE and exclude myocardial ischemia as anginal equivalent\par prognosis guarded, has follow up\par ER called\par \par

## 2019-04-22 NOTE — PHYSICAL EXAM
[General Appearance - Well Developed] : well developed [Normal Appearance] : normal appearance [General Appearance - Well Nourished] : well nourished [No Deformities] : no deformities [Normal Oropharynx] : normal oropharynx [Arterial Pulses Normal] : the arterial pulses were normal [Respiration, Rhythm And Depth] : normal respiratory rhythm and effort [Exaggerated Use Of Accessory Muscles For Inspiration] : no accessory muscle use [Cyanosis, Localized] : no localized cyanosis [No Focal Deficits] : no focal deficits [Oriented To Time, Place, And Person] : oriented to person, place, and time [Impaired Insight] : insight and judgment were intact [Affect] : the affect was normal [Mood] : the mood was normal [Memory Recent] : recent memory was not impaired [Neck Appearance] : the appearance of the neck was normal [Neck Cervical Mass (___cm)] : no neck mass was observed [Jugular Venous Distention Increased] : there was no jugular-venous distention [Thyroid Diffuse Enlargement] : the thyroid was not enlarged [Thyroid Nodule] : there were no palpable thyroid nodules [Auscultation Breath Sounds / Voice Sounds] : lungs were clear to auscultation bilaterally [Heart Rate And Rhythm] : heart rate was normal and rhythm regular [Heart Sounds] : normal S1 and S2 [Heart Sounds Gallop] : no gallops [Murmurs] : no murmurs [Heart Sounds Pericardial Friction Rub] : no pericardial rub [Examination Of The Chest] : the chest was normal in appearance [Chest Visual Inspection Thoracic Asymmetry] : no chest asymmetry [Diminished Respiratory Excursion] : normal chest expansion [Bowel Sounds] : normal bowel sounds [Abdomen Soft] : soft [Abdomen Tenderness] : non-tender [Abdomen Mass (___ Cm)] : no abdominal mass palpated [No Spinal Tenderness] : no spinal tenderness [Nail Clubbing] : no clubbing  or cyanosis of the fingernails [Motor Tone] : muscle strength and tone were normal [] : no rash [Skin Lesions] : no skin lesions [FreeTextEntry1] : Note chest wall abnormality [FreeTextEntry2] : Trace edema bilaterally

## 2019-04-22 NOTE — HISTORY OF PRESENT ILLNESS
[FreeTextEntry1] : post RLL lobectomy 4/16, prolonged hospital stay\par  now on opdivo, odff  gemzar, taxotere\par CT scan stabilizing\par worsening cough, no sputum \par no fever, chill, chest pain\par better with nebs\par has home 02 , using nocturnal and with activity 2 L\par never had sleep study\par  Oncologist Dr Collazo\par using advair bid, Tudorza bid  home neb 2-3 x day, and  daliresp \par

## 2019-04-22 NOTE — CONSULT LETTER
[Dear  ___] : Dear  [unfilled], [Consult Letter:] : I had the pleasure of evaluating your patient, [unfilled]. [Please see my note below.] : Please see my note below. [Consult Closing:] : Thank you very much for allowing me to participate in the care of this patient.  If you have any questions, please do not hesitate to contact me. [Sincerely,] : Sincerely, [DrNorah  ___] : Dr. DURAN [[facility] : [facility], [facility] [Director, Respiratory Care] : Director, Respiratory Care [[facility]] : [facility] [Duc Serrano MD] : Duc Serrano MD [Department of Cardiovascular and Thoracic Surgery] : Department of Cardiovascular and Thoracic Surgery [] :  [[facility]] : [facility] [DrNorah ___] : Dr. DURAN [FreeTextEntry3] : [facility]\par Pulmonary Critical Care\par Director Pulmonary Division\par Medical Director Respiratory Therapy\par [facility]\par \par

## 2019-04-22 NOTE — PROCEDURE
[FreeTextEntry1] : CT PET 5/18: decrease in size of R lung nodule, periportal luz elena uptake\par previous high expression of PD1\par spirometry with mild air flow obstruction, minimal change from 2017\par PET scan 2/19: stable nodule, no active disease\par spirometry stable: moderate air flow obstruction, no significant change from 11/18\par \par \par

## 2019-05-09 ENCOUNTER — APPOINTMENT (OUTPATIENT)
Dept: PULMONOLOGY | Facility: CLINIC | Age: 74
End: 2019-05-09
Payer: MEDICARE

## 2019-05-09 VITALS
DIASTOLIC BLOOD PRESSURE: 78 MMHG | OXYGEN SATURATION: 91 % | HEART RATE: 54 BPM | WEIGHT: 242 LBS | RESPIRATION RATE: 16 BRPM | SYSTOLIC BLOOD PRESSURE: 118 MMHG | BODY MASS INDEX: 39.06 KG/M2

## 2019-05-09 VITALS — OXYGEN SATURATION: 96 %

## 2019-05-09 PROCEDURE — 99214 OFFICE O/P EST MOD 30 MIN: CPT

## 2019-05-09 RX ORDER — AZITHROMYCIN DIHYDRATE 250 MG/1
250 TABLET, FILM COATED ORAL
Refills: 0 | Status: DISCONTINUED | COMMUNITY
End: 2019-05-09

## 2019-05-09 NOTE — CONSULT LETTER
[Dear  ___] : Dear  [unfilled], [Consult Letter:] : I had the pleasure of evaluating your patient, [unfilled]. [Please see my note below.] : Please see my note below. [Consult Closing:] : Thank you very much for allowing me to participate in the care of this patient.  If you have any questions, please do not hesitate to contact me. [Sincerely,] : Sincerely, [FreeTextEntry3] : [facility]\par Pulmonary Critical Care\par Director Pulmonary Division\par Medical Director Respiratory Therapy\par [facility]\par \par  [DrNorah  ___] : Dr. DURAN [DrNorah ___] : Dr. DURAN [[facility] : [facility], [facility] [Director, Respiratory Care] : Director, Respiratory Care [[facility]] : [facility] [Duc Serrano MD] : Duc Serrano MD [Department of Cardiovascular and Thoracic Surgery] : Department of Cardiovascular and Thoracic Surgery [] :  [[facility]] : [facility]

## 2019-05-09 NOTE — PHYSICAL EXAM
[General Appearance - Well Developed] : well developed [Normal Appearance] : normal appearance [General Appearance - Well Nourished] : well nourished [No Deformities] : no deformities [Normal Oropharynx] : normal oropharynx [Arterial Pulses Normal] : the arterial pulses were normal [Respiration, Rhythm And Depth] : normal respiratory rhythm and effort [Exaggerated Use Of Accessory Muscles For Inspiration] : no accessory muscle use [FreeTextEntry1] : Note chest wall abnormality [Cyanosis, Localized] : no localized cyanosis [FreeTextEntry2] : Trace edema bilaterally [No Focal Deficits] : no focal deficits [Oriented To Time, Place, And Person] : oriented to person, place, and time [Impaired Insight] : insight and judgment were intact [Affect] : the affect was normal [Mood] : the mood was normal [Memory Recent] : recent memory was not impaired [Neck Appearance] : the appearance of the neck was normal [Neck Cervical Mass (___cm)] : no neck mass was observed [Jugular Venous Distention Increased] : there was no jugular-venous distention [Thyroid Diffuse Enlargement] : the thyroid was not enlarged [Thyroid Nodule] : there were no palpable thyroid nodules [Auscultation Breath Sounds / Voice Sounds] : lungs were clear to auscultation bilaterally [Heart Rate And Rhythm] : heart rate was normal and rhythm regular [Heart Sounds] : normal S1 and S2 [Heart Sounds Gallop] : no gallops [Murmurs] : no murmurs [Heart Sounds Pericardial Friction Rub] : no pericardial rub [Examination Of The Chest] : the chest was normal in appearance [Chest Visual Inspection Thoracic Asymmetry] : no chest asymmetry [Diminished Respiratory Excursion] : normal chest expansion [Bowel Sounds] : normal bowel sounds [Abdomen Soft] : soft [Abdomen Tenderness] : non-tender [Abdomen Mass (___ Cm)] : no abdominal mass palpated [No Spinal Tenderness] : no spinal tenderness [Nail Clubbing] : no clubbing  or cyanosis of the fingernails [Motor Tone] : muscle strength and tone were normal [] : no rash [Skin Lesions] : no skin lesions

## 2019-05-09 NOTE — END OF VISIT
Final Anesthesia Post-op Assessment    Patient: Sabrina Lofton  Procedure(s) Performed: CATARACT  Anesthesia type: Monitor Anesthesia Care    Vital Last Value   Temperature 37.1 °C (98.7 °F) (04/19/18 0806)   Pulse 71 (04/19/18 0806)   Respiratory Rate 16 (04/19/18 0806)   Non-Invasive   Blood Pressure 133/55 (04/19/18 0806)   Arterial  Blood Pressure     Pulse Oximetry 97 % (04/19/18 0806)     Last 24 I/O:     Intake/Output Summary (Last 24 hours) at 04/19/18 0916  Last data filed at 04/19/18 0911   Gross per 24 hour   Intake              200 ml   Output                0 ml   Net              200 ml       PATIENT LOCATION: PACU Phase 2  POST-OP VITAL SIGNS: stable  LEVEL OF CONSCIOUSNESS: participates in exam, answers questions appropriately, alert, awake and oriented  RESPIRATORY STATUS: spontaneous ventilation and unassisted  CARDIOVASCULAR: blood pressure returned to baseline  HYDRATION: euvolemic    PAIN MANAGEMENT: well controlled  NAUSEA: None  AIRWAY PATENCY: patent  POST-OP ASSESSMENT: no complications, patient tolerated procedure well with no complications and sufficiently recovered from acute administration of anesthesia effects and able to participate in evaluation  COMPLICATIONS: none  HANDOFF:  Handoff to receiving nurse was performed and questions were answered      Vitals on arrival to PACU:  HR: 68  Pox: 96% on room air  T: 98.0  BP: 108/55  RR: 14    Doing well post-procedure. Awake and alert, answers all questions appropriately.  No apparent anesthetic complications. Patient has been reassessed and is appropriate for discharge.       [>50% of Time Spent on Counseling and Coordination of Care for  ___] : Greater than 50% of the encounter time was spent on counseling and coordination of care for [unfilled] [Time Spent: ___ minutes] : I have spent [unfilled] minutes of face to face time with the patient

## 2019-05-09 NOTE — HISTORY OF PRESENT ILLNESS
[FreeTextEntry1] : post RLL lobectomy 4/16, prolonged hospital stay\par remains  on opdivo, \par  no fever, chill, chest pain\par better with nebs\par has home 02 , using nocturnal and with activity 2 L\par  sleep study planned\par Dyspnea \par  Oncologist Dr Collazo\par using advair bid, Tudorza bid  home neb 2-3 x day, and  daliresp \par

## 2019-05-09 NOTE — DISCUSSION/SUMMARY
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, stage IA , recurrence by pathology  RML, post Radiation Therapy/Cyberknife, maintained on  opdivo, \par  last CT/PET 2/19 reviewed, stable RML nodules, resolved SUV uptake portocaval node, repeat per oncology\par spirometry stable, exam without bronchospasm\par has home 02, oxygenating well on room air today\par  sleep study scheduled\par We'll continue with Advair and Tudorza, Neb\par Daliresp with food, discussed need for  weight loss \par 4 months or sooner if needed with alba\par \par

## 2019-05-10 ENCOUNTER — APPOINTMENT (OUTPATIENT)
Dept: MRI IMAGING | Facility: CLINIC | Age: 74
End: 2019-05-10

## 2019-06-07 ENCOUNTER — APPOINTMENT (OUTPATIENT)
Dept: PULMONOLOGY | Facility: CLINIC | Age: 74
End: 2019-06-07
Payer: MEDICARE

## 2019-06-07 VITALS
WEIGHT: 243 LBS | DIASTOLIC BLOOD PRESSURE: 72 MMHG | HEART RATE: 86 BPM | BODY MASS INDEX: 39.22 KG/M2 | SYSTOLIC BLOOD PRESSURE: 126 MMHG | OXYGEN SATURATION: 89 %

## 2019-06-07 DIAGNOSIS — G47.33 OBSTRUCTIVE SLEEP APNEA (ADULT) (PEDIATRIC): ICD-10-CM

## 2019-06-07 DIAGNOSIS — R05 COUGH: ICD-10-CM

## 2019-06-07 DIAGNOSIS — J45.909 UNSPECIFIED ASTHMA, UNCOMPLICATED: ICD-10-CM

## 2019-06-07 DIAGNOSIS — R09.02 HYPOXEMIA: ICD-10-CM

## 2019-06-07 DIAGNOSIS — R91.1 SOLITARY PULMONARY NODULE: ICD-10-CM

## 2019-06-07 PROCEDURE — 99214 OFFICE O/P EST MOD 30 MIN: CPT

## 2019-06-07 RX ORDER — PROMETHAZINE HYDROCHLORIDE AND CODEINE PHOSPHATE 6.25; 1 MG/5ML; MG/5ML
SOLUTION ORAL
Refills: 0 | Status: DISCONTINUED | COMMUNITY
End: 2019-06-07

## 2019-06-07 RX ORDER — PANTOPRAZOLE 40 MG/1
40 TABLET, DELAYED RELEASE ORAL
Refills: 0 | Status: DISCONTINUED | COMMUNITY
Start: 2019-01-02 | End: 2019-06-07

## 2019-06-07 RX ORDER — BENZONATATE 150 MG/1
CAPSULE ORAL
Refills: 0 | Status: DISCONTINUED | COMMUNITY
End: 2019-06-07

## 2019-06-07 NOTE — HISTORY OF PRESENT ILLNESS
[Follow-Up - Routine Clinic] : a routine clinic follow-up of [Excessive Daytime Sleepiness] : excessive daytime sleepiness [Sleepy When Sedentary] : sleepy when sedentary [Unrefreshing Sleep] : unrefreshing sleep [Initial Evaluation] : an initial evaluation of [Excess Weight] : excess weight [Currently Experiencing] : The patient is currently experiencing symptoms. [Back Pain] : back pain [Low Calorie Diet] : low calorie diet [Dyspnea] : dyspnea [Fair Tolerance] : fair tolerance of treatment [Fair Compliance] : fair compliance with treatment [Poor Symptom Control] : poor symptom control [Dyslipidemia] : dyslipidemia [Hypertension] : hypertension [High] : high [Low Calorie] : low calorie [None] : The patient does not exercise [Well Balanced Diet] : well balanced meals [FreeTextEntry1] : Pt with h/o NSC lung cancer and is s/p resection and is followed with oncology.\par He also has a h/o O2 dependent COPD. [Witnessed Apnea During Sleep] : no witnessed apnea during sleep [Witnessed Gasping During Sleep] : no witnessed gasping during sleep [Snoring] : no snoring

## 2019-06-07 NOTE — DISCUSSION/SUMMARY
[FreeTextEntry1] : \par #1. There is no significant ROXANE noted on his overnight PSG with an AHI of 0.4. He did have moderate PLMs with an index of 16 but with only one arousal and given lack of symptoms of RLS or significant EDS, he does not appear to require therapy for his PLMs\par #2. Diet and exercise for weight loss\par #3. Continue BD therapy and O2 for COPD as per Dr. Cooper\par #4. Pain control of back pain\par #5. Oncology f/u for lung cancer\par #6. Little else to add/offer from a sleep standpoint\par #7. F/u with Dr. Cooper for his pulmonary issues\par #8. F/u with me as needed

## 2019-06-07 NOTE — CONSULT LETTER
[Dear  ___] : Dear  [unfilled], [Consult Letter:] : I had the pleasure of evaluating your patient, [unfilled]. [Consult Closing:] : Thank you very much for allowing me to participate in the care of this patient.  If you have any questions, please do not hesitate to contact me. [Please see my note below.] : Please see my note below. [Sincerely,] : Sincerely, [FreeTextEntry3] : Raheel Wesley MD, FCCP, NYLA. ABSM\par

## 2019-06-07 NOTE — REVIEW OF SYSTEMS
[Hypertension] : ~T hypertension [Back Pain] : ~T back pain [As Noted in HPI] : as noted in HPI [Fever] : no fever [Chills] : no chills [Dry Eyes] : no dryness of the eyes [Eye Irritation] : no ~T irritation of the eyes [Nasal Congestion] : no nasal congestion [Epistaxis] : no nosebleeds [Postnasal Drip] : no postnasal drip [Sinus Problems] : no sinus problems [Cough] : no cough [Sputum] : not coughing up ~M sputum [Dyspnea] : no dyspnea [Chest Tightness] : no chest tightness [Pleuritic Pain] : no pleuritic pain [Wheezing] : no wheezing [Chest Discomfort] : no chest discomfort [Dysrhythmia] : no dysrhythmia [Murmurs] : no murmurs were heard [Edema] : ~T edema was not present [Palpitations] : no palpitations [Hay Fever] : no hay fever [Itchy Eyes] : no itching of ~T the eyes [Reflux] : no reflux [Nausea] : no nausea [Vomiting] : no vomiting [Constipation] : no constipation [Diarrhea] : no diarrhea [Abdominal Pain] : no abdominal pain [Dysuria] : no dysuria [Trauma] : no ~T physical trauma [Fracture] : no fracture [Anemia] : no anemia [Headache] : no headache [Dizziness] : no dizziness [Syncope] : no fainting [Numbness] : no numbness [Paralysis] : no paralysis was seen [Seizures] : no seizures [Depression] : no depression [Anxiety] : no anxiety [Diabetes] : no diabetes mellitus [Thyroid Problem] : no thyroid problem

## 2019-06-07 NOTE — REASON FOR VISIT
[Follow-Up] : a follow-up visit [Sleep Apnea] : sleep apnea [Shortness of Breath] : shortness of Breath [FreeTextEntry2] : Hypoxia

## 2019-06-07 NOTE — PHYSICAL EXAM
[General Appearance - Well Developed] : well developed [General Appearance - In No Acute Distress] : no acute distress [Normal Appearance] : normal appearance [Normal Conjunctiva] : the conjunctiva exhibited no abnormalities [Low Lying Soft Palate] : low lying soft palate [Elongated Uvula] : elongated uvula [Enlarged Base of the Tongue] : enlargement of the base of the tongue [II] : II [Neck Appearance] : the appearance of the neck was normal [Heart Rate And Rhythm] : heart rate and rhythm were normal [Murmurs] : no murmurs present [Heart Sounds] : normal S1 and S2 [Edema] : no peripheral edema present [] : no respiratory distress [Respiration, Rhythm And Depth] : normal respiratory rhythm and effort [Auscultation Breath Sounds / Voice Sounds] : lungs were clear to auscultation bilaterally [Exaggerated Use Of Accessory Muscles For Inspiration] : no accessory muscle use [Abdomen Soft] : soft [Abdomen Tenderness] : non-tender [Nail Clubbing] : no clubbing of the fingernails [Abnormal Walk] : normal gait [Cyanosis, Localized] : no localized cyanosis [Oriented To Time, Place, And Person] : oriented to person, place, and time [No Focal Deficits] : no focal deficits [FreeTextEntry1] : No abnormalities.

## 2019-06-13 ENCOUNTER — EMERGENCY (EMERGENCY)
Facility: HOSPITAL | Age: 74
LOS: 1 days | Discharge: HOME | End: 2019-06-13
Attending: EMERGENCY MEDICINE
Payer: COMMERCIAL

## 2019-06-13 VITALS
TEMPERATURE: 98 F | WEIGHT: 240.08 LBS | OXYGEN SATURATION: 90 % | RESPIRATION RATE: 32 BRPM | HEIGHT: 66 IN | SYSTOLIC BLOOD PRESSURE: 165 MMHG | DIASTOLIC BLOOD PRESSURE: 93 MMHG | HEART RATE: 99 BPM

## 2019-06-13 VITALS
RESPIRATION RATE: 28 BRPM | HEART RATE: 90 BPM | DIASTOLIC BLOOD PRESSURE: 70 MMHG | SYSTOLIC BLOOD PRESSURE: 126 MMHG | OXYGEN SATURATION: 99 %

## 2019-06-13 DIAGNOSIS — Z85.118 PERSONAL HISTORY OF OTHER MALIGNANT NEOPLASM OF BRONCHUS AND LUNG: Chronic | ICD-10-CM

## 2019-06-13 DIAGNOSIS — Z98.89 OTHER SPECIFIED POSTPROCEDURAL STATES: Chronic | ICD-10-CM

## 2019-06-13 LAB
ALBUMIN SERPL ELPH-MCNC: 3.9 G/DL — SIGNIFICANT CHANGE UP (ref 3.3–5.2)
ALP SERPL-CCNC: 93 U/L — SIGNIFICANT CHANGE UP (ref 40–120)
ALT FLD-CCNC: 12 U/L — SIGNIFICANT CHANGE UP
ANION GAP SERPL CALC-SCNC: 14 MMOL/L — SIGNIFICANT CHANGE UP (ref 5–17)
APTT BLD: 33.9 SEC — SIGNIFICANT CHANGE UP (ref 27.5–36.3)
AST SERPL-CCNC: 15 U/L — SIGNIFICANT CHANGE UP
BILIRUB SERPL-MCNC: 0.6 MG/DL — SIGNIFICANT CHANGE UP (ref 0.4–2)
BUN SERPL-MCNC: 19 MG/DL — SIGNIFICANT CHANGE UP (ref 8–20)
CALCIUM SERPL-MCNC: 8.6 MG/DL — SIGNIFICANT CHANGE UP (ref 8.6–10.2)
CHLORIDE SERPL-SCNC: 106 MMOL/L — SIGNIFICANT CHANGE UP (ref 98–107)
CO2 SERPL-SCNC: 22 MMOL/L — SIGNIFICANT CHANGE UP (ref 22–29)
CREAT SERPL-MCNC: 0.7 MG/DL — SIGNIFICANT CHANGE UP (ref 0.5–1.3)
EOSINOPHIL # BLD AUTO: 0.1 K/UL — SIGNIFICANT CHANGE UP (ref 0–0.5)
EOSINOPHIL NFR BLD AUTO: 1 % — SIGNIFICANT CHANGE UP (ref 0–5)
GLUCOSE SERPL-MCNC: 120 MG/DL — HIGH (ref 70–115)
HCT VFR BLD CALC: 43.5 % — SIGNIFICANT CHANGE UP (ref 42–52)
HGB BLD-MCNC: 14.2 G/DL — SIGNIFICANT CHANGE UP (ref 14–18)
INR BLD: 1.09 RATIO — SIGNIFICANT CHANGE UP (ref 0.88–1.16)
LYMPHOCYTES # BLD AUTO: 1.1 K/UL — SIGNIFICANT CHANGE UP (ref 1–4.8)
LYMPHOCYTES # BLD AUTO: 15.5 % — LOW (ref 20–55)
MCHC RBC-ENTMCNC: 27.6 PG — SIGNIFICANT CHANGE UP (ref 27–31)
MCHC RBC-ENTMCNC: 32.6 G/DL — SIGNIFICANT CHANGE UP (ref 32–36)
MCV RBC AUTO: 84.5 FL — SIGNIFICANT CHANGE UP (ref 80–94)
MONOCYTES # BLD AUTO: 0.5 K/UL — SIGNIFICANT CHANGE UP (ref 0–0.8)
MONOCYTES NFR BLD AUTO: 7.1 % — SIGNIFICANT CHANGE UP (ref 3–10)
NEUTROPHILS # BLD AUTO: 5.2 K/UL — SIGNIFICANT CHANGE UP (ref 1.8–8)
NEUTROPHILS NFR BLD AUTO: 76.3 % — HIGH (ref 37–73)
OB PNL STL: POSITIVE
PLATELET # BLD AUTO: 198 K/UL — SIGNIFICANT CHANGE UP (ref 150–400)
POTASSIUM SERPL-MCNC: 3.8 MMOL/L — SIGNIFICANT CHANGE UP (ref 3.5–5.3)
POTASSIUM SERPL-SCNC: 3.8 MMOL/L — SIGNIFICANT CHANGE UP (ref 3.5–5.3)
PROT SERPL-MCNC: 6.1 G/DL — LOW (ref 6.6–8.7)
PROTHROM AB SERPL-ACNC: 12.6 SEC — SIGNIFICANT CHANGE UP (ref 10–12.9)
RBC # BLD: 5.15 M/UL — SIGNIFICANT CHANGE UP (ref 4.6–6.2)
RBC # FLD: 15.7 % — HIGH (ref 11–15.6)
SODIUM SERPL-SCNC: 142 MMOL/L — SIGNIFICANT CHANGE UP (ref 135–145)
TYPE + AB SCN PNL BLD: SIGNIFICANT CHANGE UP
WBC # BLD: 6.9 K/UL — SIGNIFICANT CHANGE UP (ref 4.8–10.8)
WBC # FLD AUTO: 6.9 K/UL — SIGNIFICANT CHANGE UP (ref 4.8–10.8)

## 2019-06-13 PROCEDURE — 99285 EMERGENCY DEPT VISIT HI MDM: CPT

## 2019-06-13 PROCEDURE — 80053 COMPREHEN METABOLIC PANEL: CPT

## 2019-06-13 PROCEDURE — 85027 COMPLETE CBC AUTOMATED: CPT

## 2019-06-13 PROCEDURE — 86850 RBC ANTIBODY SCREEN: CPT

## 2019-06-13 PROCEDURE — 86901 BLOOD TYPING SEROLOGIC RH(D): CPT

## 2019-06-13 PROCEDURE — 71045 X-RAY EXAM CHEST 1 VIEW: CPT | Mod: 26

## 2019-06-13 PROCEDURE — 99285 EMERGENCY DEPT VISIT HI MDM: CPT | Mod: 25

## 2019-06-13 PROCEDURE — 93010 ELECTROCARDIOGRAM REPORT: CPT

## 2019-06-13 PROCEDURE — 36415 COLL VENOUS BLD VENIPUNCTURE: CPT

## 2019-06-13 PROCEDURE — 94640 AIRWAY INHALATION TREATMENT: CPT

## 2019-06-13 PROCEDURE — 71045 X-RAY EXAM CHEST 1 VIEW: CPT

## 2019-06-13 PROCEDURE — 85610 PROTHROMBIN TIME: CPT

## 2019-06-13 PROCEDURE — 93005 ELECTROCARDIOGRAM TRACING: CPT

## 2019-06-13 PROCEDURE — 85730 THROMBOPLASTIN TIME PARTIAL: CPT

## 2019-06-13 PROCEDURE — 82272 OCCULT BLD FECES 1-3 TESTS: CPT

## 2019-06-13 PROCEDURE — 96374 THER/PROPH/DIAG INJ IV PUSH: CPT

## 2019-06-13 PROCEDURE — 86900 BLOOD TYPING SEROLOGIC ABO: CPT

## 2019-06-13 RX ORDER — OXYCODONE AND ACETAMINOPHEN 5; 325 MG/1; MG/1
1 TABLET ORAL ONCE
Refills: 0 | Status: DISCONTINUED | OUTPATIENT
Start: 2019-06-13 | End: 2019-06-13

## 2019-06-13 RX ORDER — IPRATROPIUM/ALBUTEROL SULFATE 18-103MCG
3 AEROSOL WITH ADAPTER (GRAM) INHALATION ONCE
Refills: 0 | Status: COMPLETED | OUTPATIENT
Start: 2019-06-13 | End: 2019-06-13

## 2019-06-13 RX ADMIN — Medication 3 MILLILITER(S): at 12:28

## 2019-06-13 RX ADMIN — Medication 3 MILLILITER(S): at 12:57

## 2019-06-13 RX ADMIN — Medication 125 MILLIGRAM(S): at 12:28

## 2019-06-13 RX ADMIN — Medication 3 MILLILITER(S): at 12:34

## 2019-06-13 NOTE — ED ADULT NURSE NOTE - NSIMPLEMENTINTERV_GEN_ALL_ED
Implemented All Universal Safety Interventions:  [place] to call system. Call bell, personal items and telephone within reach. Instruct patient to call for assistance. Room bathroom lighting operational. Non-slip footwear when patient is off stretcher. Physically safe environment: no spills, clutter or unnecessary equipment. Stretcher in lowest position, wheels locked, appropriate side rails in place.

## 2019-06-13 NOTE — ED ADULT NURSE REASSESSMENT NOTE - NS ED NURSE REASSESS COMMENT FT1
R chest wall mediport access after confirmed on xray by md, accessed per protocol. pt avril procedure well.

## 2019-06-13 NOTE — ED PROVIDER NOTE - CLINICAL SUMMARY MEDICAL DECISION MAKING FREE TEXT BOX
73M not on AC p/w BRBPR since yesterday. not tachy or hypotensive, no clinical signs of anemia and rectal exam does not seem to show any blood- will send guiaic, cbc, type, transfuse if anemic. no abd pain and no signs of significant bleeding to necessitate ct/cta at this time. the diff breathing is likely copd exacerbation- pt not moving air well at this time will give nebs steroids, check xr and reassess.

## 2019-06-13 NOTE — ED PROVIDER NOTE - NSFOLLOWUPINSTRUCTIONS_ED_ALL_ED_FT
Chronic Obstructive Pulmonary Disease    Chronic obstructive pulmonary disease (COPD) is a lung condition in which airflow from the lungs is limited. Causes include smoking, secondhand smoke exposure, genetics, or recurrent infections. Take all medicines (inhaled or pills) as directed by your health care provider. Avoid exposure to irritants such as smoke, chemicals, and fumes that aggravate your breathing.    If you are a smoker, the most important thing that you can do is stop smoking. Continuing to smoke will cause further lung damage and breathing trouble. Ask your health care provider for help with quitting smoking.    SEEK IMMEDIATE MEDICAL CARE IF YOU HAVE ANY OF THE FOLLOWING SYMPTOMS: shortness of breath at rest or when talking, bluish discoloration of lips, skin, fever, worsening cough, unexplained chest pain, or lightheadedness/dizziness.       please follow up with gastroenterology in the next week for scheduling of a colonoscopy.  return to the ER immediately for increasing bleeding, chest pain, shortness of breath, dizziness, lightheadedness, or any other concerns.

## 2019-06-13 NOTE — ED PROVIDER NOTE - OBJECTIVE STATEMENT
Leonor Shin M.D: 73M hx copd, lung ca on chemo, htn, hld, p/w bloody stool since yesterday, then started having difficulty breahting in the WR. notes yesterday ahd some diarrhea and then had BRBPR and blood mixed in the stool. no dark stool. denies any clots in stool. no abd pain. had a colonoscopy >6years ago, believes it was normal. not on AC. noted he got worked up this AM because he was trying to find parking and at that time began to have difficulty breathing. no cough no fc no cp. no dizziness/no lightheadedness

## 2019-06-13 NOTE — ED PROVIDER NOTE - PHYSICAL EXAMINATION
Leonor Shin M.D.:   patient awake alert seen lying on stretcher in mild resp distress- tachypnic, hypoxic on RA.   LUNGS poor air movement throughout lung fields.   CARD RRR no m/r/g.    Abdomen soft NT ND no rebound no guarding no CVA tenderness. rectal exam performed with palpable internal hemorrhoid, scant stool in vault appears brown/yellow xent for guiaic.   EXT WWP no edema no calf tenderness CV 2+DP/PT bilaterally.   neuro A&Ox3 gait normal.    skin warm and dry no rash no pallor  HEENT: moist mucous membranes, PERRL, EOMI

## 2019-06-13 NOTE — ED PROVIDER NOTE - PROGRESS NOTE DETAILS
Leonor Shin M.D: pt reassessed. breathing significantly improved- pt feels at baseline now. feels well otherwise and would like to go home. discussed the positive occult blood with normal hb and normal vitals-- likely bleeding 2/2 hemorrhoid. given pt not on AC feel comfortable with dc home and outpatient GI follow up. pt expressed understanding of this and understands need to return should bleeding increase or he develop cp sob abd pain lightheadedness/syncope.

## 2019-06-13 NOTE — ED ADULT NURSE NOTE - OBJECTIVE STATEMENT
pt presents to ED for eval of rectal bleeding that started today, while waiting in waiting room, pt reports " I got anxious and had trouble breathing," chronic home oxygen 2 l ncm pmh copd, lung ca, last treatment 5/19. bilateral clear breath sounds, abd distended. non tender, + pedal pulses. skin warm and dry. denies chest pain

## 2019-06-13 NOTE — ED PROVIDER NOTE - ATTENDING CONTRIBUTION TO CARE
I personally saw the patient with the resident, and completed the key components of the history and physical exam. I then discussed the management plan with the resident.    73M hx copd, lung ca on chemo, htn, hld, p/w bloody stool since yesterday, then started having difficulty breathing in the WR. notes yesterday ahd some diarrhea and then had BRBPR and blood mixed in the stool. no dark stool. denies any clots in stool. no abd pain. had a colonoscopy >6years ago, believes it was normal. not on AC. noted he got worked up this AM because he was trying to find parking and at that time began to have difficulty breathing. no cough no fc no cp. no dizziness/no lightheadedness    patient awake alert seen lying on stretcher in mild resp distress- tachypnic, hypoxic on RA.   LUNGS poor air movement throughout lung fields.   CARD RRR no m/r/g.    Abdomen soft NT ND no rebound no guarding no CVA tenderness. rectal exam performed with palpable internal hemorrhoid, scant stool in vault appears brown/yellow xent for guiaic.   EXT WWP no edema no calf tenderness CV 2+DP/PT bilaterally.   neuro A&Ox3 gait normal.    skin warm and dry no rash no pallor  HEENT: moist mucous membranes, PERRL, EOMI

## 2019-09-11 ENCOUNTER — APPOINTMENT (OUTPATIENT)
Dept: PULMONOLOGY | Facility: CLINIC | Age: 74
End: 2019-09-11
Payer: MEDICARE

## 2019-09-11 VITALS — OXYGEN SATURATION: 95 % | HEART RATE: 84 BPM

## 2019-09-11 VITALS
HEIGHT: 65.75 IN | WEIGHT: 236 LBS | DIASTOLIC BLOOD PRESSURE: 66 MMHG | SYSTOLIC BLOOD PRESSURE: 116 MMHG | BODY MASS INDEX: 38.38 KG/M2

## 2019-09-11 DIAGNOSIS — C34.31 MALIGNANT NEOPLASM OF LOWER LOBE, RIGHT BRONCHUS OR LUNG: ICD-10-CM

## 2019-09-11 DIAGNOSIS — R06.02 SHORTNESS OF BREATH: ICD-10-CM

## 2019-09-11 PROCEDURE — 99214 OFFICE O/P EST MOD 30 MIN: CPT

## 2019-09-11 NOTE — PROCEDURE
[FreeTextEntry1] : CT PET 5/18: decrease in size of R lung nodule, periportal luz elena uptake\par previous high expression of PD1\par spirometry with mild air flow obstruction, minimal change from 2017\par PET scan 7/19: stable nodule, no active disease, slight increase SUV with marker R\par spirometry stable: moderate air flow obstruction, no significant change from 11/18\par \par \par

## 2019-09-11 NOTE — CONSULT LETTER
[Dear  ___] : Dear  [unfilled], [Consult Letter:] : I had the pleasure of evaluating your patient, [unfilled]. [Please see my note below.] : Please see my note below. [Consult Closing:] : Thank you very much for allowing me to participate in the care of this patient.  If you have any questions, please do not hesitate to contact me. [Sincerely,] : Sincerely, [DrNorah  ___] : Dr. DURAN [DrNorah ___] : Dr. DURAN [[facility] Cooper DO, [facility]] : oJse Cooper DO, [facility] [Director, Respiratory Care] : Director, Respiratory Care [[facility]] : [facility] [Duc Serrano MD] : Duc Serrano MD [Department of Cardiovascular and Thoracic Surgery] : Department of Cardiovascular and Thoracic Surgery [] :  [[facility]] : [facility] [FreeTextEntry3] : [facility] DO [facility]\par Pulmonary Critical Care\par Director Pulmonary Division\par Medical Director Respiratory Therapy\par [facility]\par \par

## 2019-09-11 NOTE — DISCUSSION/SUMMARY
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 signfigance\par spirometry has been stable, exam without bronchospasm\par has home 02, \par  sleep study negative for any sig ROXANE\par We'll continue with Advair and Tudorza, Neb\par Daliresp with food, discussed need for  weight loss \par 3 months or sooner if needed with alba\par vaccinations this fall\par \par

## 2019-09-11 NOTE — REVIEW OF SYSTEMS
[Hypertension] : ~T hypertension [Back Pain] : ~T back pain [As Noted in HPI] : as noted in HPI [SOB on Exertion] : shortness of breath during exertion [Negative] : Heme/Lymph [Fever] : no fever [Chills] : no chills [Dry Eyes] : no dryness of the eyes [Eye Irritation] : no ~T irritation of the eyes [Nasal Congestion] : no nasal congestion [Epistaxis] : no nosebleeds [Postnasal Drip] : no postnasal drip [Cough] : no cough [Sinus Problems] : no sinus problems [Sputum] : not coughing up ~M sputum [Dyspnea] : no dyspnea [Chest Tightness] : no chest tightness [Pleuritic Pain] : no pleuritic pain [Wheezing] : no wheezing [Chest Discomfort] : no chest discomfort [Murmurs] : no murmurs were heard [Dysrhythmia] : no dysrhythmia [Palpitations] : no palpitations [Edema] : ~T edema was not present [Hay Fever] : no hay fever [Itchy Eyes] : no itching of ~T the eyes [Reflux] : no reflux [Nausea] : no nausea [Vomiting] : no vomiting [Constipation] : no constipation [Diarrhea] : no diarrhea [Abdominal Pain] : no abdominal pain [Dysuria] : no dysuria [Trauma] : no ~T physical trauma [Fracture] : no fracture [Anemia] : no anemia [Headache] : no headache [Dizziness] : no dizziness [Syncope] : no fainting [Numbness] : no numbness [Paralysis] : no paralysis was seen [Seizures] : no seizures [Depression] : no depression [Anxiety] : no anxiety [Diabetes] : no diabetes mellitus [Thyroid Problem] : no thyroid problem

## 2019-09-11 NOTE — HISTORY OF PRESENT ILLNESS
[FreeTextEntry1] : post RLL lobectomy 4/16, prolonged hospital stay\par remains  on opdivo, \par  no fever, chill, chest pain\par better with nebs\par has home 02 , using nocturnal and with activity 2 L\par  sleep study negative for ROXANE\par Dyspnea at baseline\par  Oncologist Dr Collazo\par using advair bid, Tudorza bid  home neb 2-3 x day, and  daliresp \par

## 2019-09-11 NOTE — REASON FOR VISIT
[Follow-Up] : a follow-up visit [COPD] : COPD [Shortness of Breath] : shortness of Breath [Sleep Apnea] : sleep apnea [FreeTextEntry2] : obesity/post

## 2019-09-11 NOTE — PHYSICAL EXAM
[General Appearance - Well Developed] : well developed [Normal Appearance] : normal appearance [General Appearance - Well Nourished] : well nourished [No Deformities] : no deformities [General Appearance - In No Acute Distress] : no acute distress [Normal Conjunctiva] : the conjunctiva exhibited no abnormalities [Normal Oropharynx] : normal oropharynx [Low Lying Soft Palate] : low lying soft palate [Elongated Uvula] : elongated uvula [Enlarged Base of the Tongue] : enlargement of the base of the tongue [II] : II [Arterial Pulses Normal] : the arterial pulses were normal [Edema] : no peripheral edema present [Exaggerated Use Of Accessory Muscles For Inspiration] : no accessory muscle use [Respiration, Rhythm And Depth] : normal respiratory rhythm and effort [Abnormal Walk] : normal gait [Cyanosis, Localized] : no localized cyanosis [No Focal Deficits] : no focal deficits [Oriented To Time, Place, And Person] : oriented to person, place, and time [Impaired Insight] : insight and judgment were intact [Mood] : the mood was normal [Affect] : the affect was normal [Memory Recent] : recent memory was not impaired [Neck Appearance] : the appearance of the neck was normal [Neck Cervical Mass (___cm)] : no neck mass was observed [Jugular Venous Distention Increased] : there was no jugular-venous distention [Thyroid Diffuse Enlargement] : the thyroid was not enlarged [Thyroid Nodule] : there were no palpable thyroid nodules [Auscultation Breath Sounds / Voice Sounds] : lungs were clear to auscultation bilaterally [Heart Rate And Rhythm] : heart rate was normal and rhythm regular [Heart Sounds] : normal S1 and S2 [Heart Sounds Gallop] : no gallops [Murmurs] : no murmurs [Heart Sounds Pericardial Friction Rub] : no pericardial rub [Examination Of The Chest] : the chest was normal in appearance [Chest Visual Inspection Thoracic Asymmetry] : no chest asymmetry [Diminished Respiratory Excursion] : normal chest expansion [Bowel Sounds] : normal bowel sounds [Abdomen Soft] : soft [Abdomen Tenderness] : non-tender [Abdomen Mass (___ Cm)] : no abdominal mass palpated [No Spinal Tenderness] : no spinal tenderness [Nail Clubbing] : no clubbing  or cyanosis of the fingernails [Motor Tone] : muscle strength and tone were normal [] : no rash [Skin Lesions] : no skin lesions [FreeTextEntry1] : Note chest wall abnormality [FreeTextEntry2] : Trace edema bilaterally

## 2019-11-07 ENCOUNTER — MEDICATION RENEWAL (OUTPATIENT)
Age: 74
End: 2019-11-07

## 2019-11-07 RX ORDER — FLUTICASONE PROPIONATE AND SALMETEROL 50; 250 UG/1; UG/1
250-50 POWDER RESPIRATORY (INHALATION)
Qty: 1 | Refills: 5 | Status: DISCONTINUED | COMMUNITY
Start: 2017-12-20 | End: 2019-11-07

## 2019-11-08 RX ORDER — FLUTICASONE PROPIONATE AND SALMETEROL 250; 50 UG/1; UG/1
250-50 POWDER RESPIRATORY (INHALATION)
Qty: 3 | Refills: 3 | Status: DISCONTINUED | COMMUNITY
Start: 2019-11-07 | End: 2019-11-08

## 2019-12-11 ENCOUNTER — APPOINTMENT (OUTPATIENT)
Dept: PULMONOLOGY | Facility: CLINIC | Age: 74
End: 2019-12-11

## 2020-01-06 ENCOUNTER — RX RENEWAL (OUTPATIENT)
Age: 75
End: 2020-01-06

## 2020-01-07 RX ORDER — TIOTROPIUM BROMIDE 18 UG/1
18 CAPSULE ORAL; RESPIRATORY (INHALATION) DAILY
Qty: 1 | Refills: 2 | Status: ACTIVE | COMMUNITY
Start: 2020-01-07 | End: 1900-01-01

## 2020-01-07 RX ORDER — ACLIDINIUM BROMIDE 400 UG/1
400 POWDER, METERED RESPIRATORY (INHALATION)
Qty: 3 | Refills: 3 | Status: DISCONTINUED | COMMUNITY
Start: 2017-06-05 | End: 2020-01-07

## 2020-01-30 RX ORDER — FLUTICASONE PROPIONATE AND SALMETEROL 250; 50 UG/1; UG/1
250-50 POWDER RESPIRATORY (INHALATION)
Qty: 1 | Refills: 2 | Status: ACTIVE | COMMUNITY
Start: 2019-11-08 | End: 1900-01-01

## 2020-03-04 ENCOUNTER — APPOINTMENT (OUTPATIENT)
Dept: PULMONOLOGY | Facility: CLINIC | Age: 75
End: 2020-03-04
Payer: MEDICARE

## 2020-03-04 VITALS — HEART RATE: 72 BPM | OXYGEN SATURATION: 95 % | DIASTOLIC BLOOD PRESSURE: 76 MMHG | SYSTOLIC BLOOD PRESSURE: 138 MMHG

## 2020-03-04 VITALS — WEIGHT: 239 LBS | HEIGHT: 64 IN | BODY MASS INDEX: 40.8 KG/M2

## 2020-03-04 DIAGNOSIS — E66.01 MORBID (SEVERE) OBESITY DUE TO EXCESS CALORIES: ICD-10-CM

## 2020-03-04 DIAGNOSIS — J44.9 CHRONIC OBSTRUCTIVE PULMONARY DISEASE, UNSPECIFIED: ICD-10-CM

## 2020-03-04 DIAGNOSIS — R06.83 SNORING: ICD-10-CM

## 2020-03-04 PROCEDURE — 94010 BREATHING CAPACITY TEST: CPT

## 2020-03-04 PROCEDURE — 99215 OFFICE O/P EST HI 40 MIN: CPT | Mod: 25

## 2020-03-04 RX ORDER — ALBUTEROL SULFATE 90 UG/1
108 (90 BASE) AEROSOL, METERED RESPIRATORY (INHALATION)
Refills: 0 | Status: DISCONTINUED | COMMUNITY
End: 2020-03-04

## 2020-03-04 RX ORDER — ALBUTEROL SULFATE 90 UG/1
108 (90 BASE) AEROSOL, METERED RESPIRATORY (INHALATION)
Qty: 1 | Refills: 5 | Status: ACTIVE | COMMUNITY
Start: 2017-09-07 | End: 1900-01-01

## 2020-03-12 ENCOUNTER — INPATIENT (INPATIENT)
Facility: HOSPITAL | Age: 75
LOS: 6 days | Discharge: HOME HEALTH | End: 2020-03-19
Payer: MEDICARE

## 2020-03-12 DIAGNOSIS — Z85.118 PERSONAL HISTORY OF OTHER MALIGNANT NEOPLASM OF BRONCHUS AND LUNG: Chronic | ICD-10-CM

## 2020-03-12 DIAGNOSIS — Z98.89 OTHER SPECIFIED POSTPROCEDURAL STATES: Chronic | ICD-10-CM

## 2020-03-12 PROCEDURE — 93970 EXTREMITY STUDY: CPT | Mod: 26

## 2020-03-12 PROCEDURE — 71045 X-RAY EXAM CHEST 1 VIEW: CPT | Mod: 26

## 2020-03-12 PROCEDURE — 99285 EMERGENCY DEPT VISIT HI MDM: CPT

## 2020-03-12 PROCEDURE — 71275 CT ANGIOGRAPHY CHEST: CPT | Mod: 26

## 2020-03-13 ENCOUNTER — OUTPATIENT (OUTPATIENT)
Dept: OUTPATIENT SERVICES | Facility: HOSPITAL | Age: 75
LOS: 1 days | End: 2020-03-13

## 2020-03-13 DIAGNOSIS — Z98.89 OTHER SPECIFIED POSTPROCEDURAL STATES: Chronic | ICD-10-CM

## 2020-03-13 DIAGNOSIS — Z85.118 PERSONAL HISTORY OF OTHER MALIGNANT NEOPLASM OF BRONCHUS AND LUNG: Chronic | ICD-10-CM

## 2020-03-14 ENCOUNTER — OUTPATIENT (OUTPATIENT)
Dept: OUTPATIENT SERVICES | Facility: HOSPITAL | Age: 75
LOS: 1 days | End: 2020-03-14

## 2020-03-14 DIAGNOSIS — Z98.89 OTHER SPECIFIED POSTPROCEDURAL STATES: Chronic | ICD-10-CM

## 2020-03-14 DIAGNOSIS — Z85.118 PERSONAL HISTORY OF OTHER MALIGNANT NEOPLASM OF BRONCHUS AND LUNG: Chronic | ICD-10-CM

## 2020-03-16 ENCOUNTER — OUTPATIENT (OUTPATIENT)
Dept: OUTPATIENT SERVICES | Facility: HOSPITAL | Age: 75
LOS: 1 days | End: 2020-03-16

## 2020-03-16 DIAGNOSIS — Z98.89 OTHER SPECIFIED POSTPROCEDURAL STATES: Chronic | ICD-10-CM

## 2020-03-16 DIAGNOSIS — Z85.118 PERSONAL HISTORY OF OTHER MALIGNANT NEOPLASM OF BRONCHUS AND LUNG: Chronic | ICD-10-CM

## 2020-03-16 PROCEDURE — 93306 TTE W/DOPPLER COMPLETE: CPT | Mod: 26

## 2020-03-17 ENCOUNTER — OUTPATIENT (OUTPATIENT)
Dept: OUTPATIENT SERVICES | Facility: HOSPITAL | Age: 75
LOS: 1 days | End: 2020-03-17

## 2020-03-17 DIAGNOSIS — Z98.89 OTHER SPECIFIED POSTPROCEDURAL STATES: Chronic | ICD-10-CM

## 2020-03-17 DIAGNOSIS — Z85.118 PERSONAL HISTORY OF OTHER MALIGNANT NEOPLASM OF BRONCHUS AND LUNG: Chronic | ICD-10-CM

## 2020-03-18 ENCOUNTER — OUTPATIENT (OUTPATIENT)
Dept: OUTPATIENT SERVICES | Facility: HOSPITAL | Age: 75
LOS: 1 days | End: 2020-03-18

## 2020-03-18 DIAGNOSIS — Z85.118 PERSONAL HISTORY OF OTHER MALIGNANT NEOPLASM OF BRONCHUS AND LUNG: Chronic | ICD-10-CM

## 2020-03-18 DIAGNOSIS — Z98.89 OTHER SPECIFIED POSTPROCEDURAL STATES: Chronic | ICD-10-CM

## 2020-03-19 ENCOUNTER — OUTPATIENT (OUTPATIENT)
Dept: OUTPATIENT SERVICES | Facility: HOSPITAL | Age: 75
LOS: 1 days | End: 2020-03-19

## 2020-03-19 DIAGNOSIS — Z85.118 PERSONAL HISTORY OF OTHER MALIGNANT NEOPLASM OF BRONCHUS AND LUNG: Chronic | ICD-10-CM

## 2020-03-19 DIAGNOSIS — Z98.89 OTHER SPECIFIED POSTPROCEDURAL STATES: Chronic | ICD-10-CM

## 2020-04-07 ENCOUNTER — APPOINTMENT (OUTPATIENT)
Dept: PULMONOLOGY | Facility: CLINIC | Age: 75
End: 2020-04-07

## 2020-04-18 ENCOUNTER — INPATIENT (INPATIENT)
Facility: HOSPITAL | Age: 75
LOS: 9 days | Discharge: HOME | End: 2020-04-28
Payer: MEDICARE

## 2020-04-18 ENCOUNTER — OUTPATIENT (OUTPATIENT)
Dept: OUTPATIENT SERVICES | Facility: HOSPITAL | Age: 75
LOS: 1 days | End: 2020-04-18

## 2020-04-18 DIAGNOSIS — Z98.89 OTHER SPECIFIED POSTPROCEDURAL STATES: Chronic | ICD-10-CM

## 2020-04-18 DIAGNOSIS — Z85.118 PERSONAL HISTORY OF OTHER MALIGNANT NEOPLASM OF BRONCHUS AND LUNG: Chronic | ICD-10-CM

## 2020-04-18 PROCEDURE — 71045 X-RAY EXAM CHEST 1 VIEW: CPT | Mod: 26

## 2020-04-18 PROCEDURE — 99285 EMERGENCY DEPT VISIT HI MDM: CPT

## 2020-04-19 ENCOUNTER — OUTPATIENT (OUTPATIENT)
Dept: OUTPATIENT SERVICES | Facility: HOSPITAL | Age: 75
LOS: 1 days | End: 2020-04-19

## 2020-04-19 DIAGNOSIS — Z98.89 OTHER SPECIFIED POSTPROCEDURAL STATES: Chronic | ICD-10-CM

## 2020-04-19 DIAGNOSIS — Z85.118 PERSONAL HISTORY OF OTHER MALIGNANT NEOPLASM OF BRONCHUS AND LUNG: Chronic | ICD-10-CM

## 2020-04-19 PROCEDURE — 71250 CT THORAX DX C-: CPT | Mod: 26

## 2020-04-20 ENCOUNTER — OUTPATIENT (OUTPATIENT)
Dept: OUTPATIENT SERVICES | Facility: HOSPITAL | Age: 75
LOS: 1 days | End: 2020-04-20

## 2020-04-20 DIAGNOSIS — Z98.89 OTHER SPECIFIED POSTPROCEDURAL STATES: Chronic | ICD-10-CM

## 2020-04-20 DIAGNOSIS — Z85.118 PERSONAL HISTORY OF OTHER MALIGNANT NEOPLASM OF BRONCHUS AND LUNG: Chronic | ICD-10-CM

## 2020-04-20 PROCEDURE — 99223 1ST HOSP IP/OBS HIGH 75: CPT

## 2020-04-21 ENCOUNTER — OUTPATIENT (OUTPATIENT)
Dept: OUTPATIENT SERVICES | Facility: HOSPITAL | Age: 75
LOS: 1 days | End: 2020-04-21

## 2020-04-21 DIAGNOSIS — Z85.118 PERSONAL HISTORY OF OTHER MALIGNANT NEOPLASM OF BRONCHUS AND LUNG: Chronic | ICD-10-CM

## 2020-04-21 DIAGNOSIS — Z98.89 OTHER SPECIFIED POSTPROCEDURAL STATES: Chronic | ICD-10-CM

## 2020-04-22 ENCOUNTER — OUTPATIENT (OUTPATIENT)
Dept: OUTPATIENT SERVICES | Facility: HOSPITAL | Age: 75
LOS: 1 days | End: 2020-04-22

## 2020-04-22 DIAGNOSIS — Z98.89 OTHER SPECIFIED POSTPROCEDURAL STATES: Chronic | ICD-10-CM

## 2020-04-22 DIAGNOSIS — Z85.118 PERSONAL HISTORY OF OTHER MALIGNANT NEOPLASM OF BRONCHUS AND LUNG: Chronic | ICD-10-CM

## 2020-04-23 ENCOUNTER — OUTPATIENT (OUTPATIENT)
Dept: OUTPATIENT SERVICES | Facility: HOSPITAL | Age: 75
LOS: 1 days | End: 2020-04-23

## 2020-04-23 DIAGNOSIS — Z98.89 OTHER SPECIFIED POSTPROCEDURAL STATES: Chronic | ICD-10-CM

## 2020-04-23 DIAGNOSIS — Z85.118 PERSONAL HISTORY OF OTHER MALIGNANT NEOPLASM OF BRONCHUS AND LUNG: Chronic | ICD-10-CM

## 2020-04-23 PROCEDURE — 71045 X-RAY EXAM CHEST 1 VIEW: CPT | Mod: 26

## 2020-04-23 NOTE — REASON FOR VISIT
[Follow-Up] : a follow-up visit [Shortness of Breath] : shortness of Breath [COPD] : COPD [Sleep Apnea] : sleep apnea [FreeTextEntry2] : obesity/post

## 2020-04-23 NOTE — HISTORY OF PRESENT ILLNESS
[TextBox_4] : post RLL lobectomy 4/16, prolonged hospital stay\par remains  on opdivo monthly\par  no fever, chill, chest pain\par better with nebs\par has home 02 , using nocturnal and with activity 2 L\par  sleep study negative for ROXANE\par Dyspnea and fatigue worse than baseline\par  Oncologist deisy Pedroza last PET 1/20: CLARY\par using advair bid, Tudorza bid  home neb 2-3 x day, and  daliresp  [FreeTextEntry1] : \par

## 2020-04-23 NOTE — PROCEDURE
[FreeTextEntry1] : CT PET 5/18: decrease in size of R lung nodule, periportal luz elena uptake\par previous high expression of PD1\par \par PET scan 1/20 CLARY, no hypermetabolic uptake\par spirometry with mild air flow obstruction, no sig change from 3/19\par desat with exercise 83% 4 liters pulsed\par \par \par

## 2020-04-23 NOTE — PHYSICAL EXAM
[General Appearance - Well Developed] : well developed [General Appearance - Well Nourished] : well nourished [Normal Appearance] : normal appearance [No Deformities] : no deformities [General Appearance - In No Acute Distress] : no acute distress [Normal Conjunctiva] : the conjunctiva exhibited no abnormalities [Low Lying Soft Palate] : low lying soft palate [Normal Oropharynx] : normal oropharynx [II] : II [Elongated Uvula] : elongated uvula [Enlarged Base of the Tongue] : enlargement of the base of the tongue [Edema] : no peripheral edema present [Arterial Pulses Normal] : the arterial pulses were normal [Respiration, Rhythm And Depth] : normal respiratory rhythm and effort [Exaggerated Use Of Accessory Muscles For Inspiration] : no accessory muscle use [Cyanosis, Localized] : no localized cyanosis [Abnormal Walk] : normal gait [No Focal Deficits] : no focal deficits [Oriented To Time, Place, And Person] : oriented to person, place, and time [Affect] : the affect was normal [Impaired Insight] : insight and judgment were intact [Mood] : the mood was normal [Memory Recent] : recent memory was not impaired [Neck Appearance] : the appearance of the neck was normal [Neck Cervical Mass (___cm)] : no neck mass was observed [Thyroid Diffuse Enlargement] : the thyroid was not enlarged [Jugular Venous Distention Increased] : there was no jugular-venous distention [Thyroid Nodule] : there were no palpable thyroid nodules [Auscultation Breath Sounds / Voice Sounds] : lungs were clear to auscultation bilaterally [Heart Rate And Rhythm] : heart rate was normal and rhythm regular [Heart Sounds Gallop] : no gallops [Heart Sounds] : normal S1 and S2 [Murmurs] : no murmurs [Heart Sounds Pericardial Friction Rub] : no pericardial rub [Examination Of The Chest] : the chest was normal in appearance [Chest Visual Inspection Thoracic Asymmetry] : no chest asymmetry [Diminished Respiratory Excursion] : normal chest expansion [Bowel Sounds] : normal bowel sounds [Abdomen Tenderness] : non-tender [Abdomen Soft] : soft [Abdomen Mass (___ Cm)] : no abdominal mass palpated [Nail Clubbing] : no clubbing  or cyanosis of the fingernails [No Spinal Tenderness] : no spinal tenderness [Skin Lesions] : no skin lesions [] : no rash [Motor Tone] : muscle strength and tone were normal [FreeTextEntry2] : Trace edema bilaterally [FreeTextEntry1] : Note chest wall abnormality

## 2020-04-23 NOTE — DISCUSSION/SUMMARY
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, stage IA , recurrence by pathology  RML, post Radiation Therapy/Cyberknife, maintained on  opdivo monthly\par  last CT/PET 1/20: CLARY, no hypermetabolic uptake\par spirometry remains stable, mild obstruction, no change FVC or FEV1 exam without bronchospasm\par has home 02, worsening dyspnea and exercise desaturation noted\par advised ER, he refuses, understands risk, will send for stat [facility]\par  sleep study negative for any sig ROXANE\par We'll continue with Advair and Tudorza, Neb\par increase 02 5 L pulsed with exercise, does not want 02 tanks, has POC\par Cardiology follow up for echocardiogram, stress\par Daliresp with food, discussed need for  weight loss \par 4 weeks  or sooner if needed with alba\par vaccinations this  winter\par prognosis guarded\par \par

## 2020-04-23 NOTE — REVIEW OF SYSTEMS
[Hypertension] : ~T hypertension [Back Pain] : ~T back pain [As Noted in HPI] : as noted in HPI [SOB on Exertion] : shortness of breath during exertion [Negative] : Endocrine [Fever] : no fever [Chills] : no chills [Dry Eyes] : no dryness of the eyes [Eye Irritation] : no ~T irritation of the eyes [Nasal Congestion] : no nasal congestion [Epistaxis] : no nosebleeds [Postnasal Drip] : no postnasal drip [Sinus Problems] : no sinus problems [Cough] : no cough [Sputum] : not coughing up ~M sputum [Dyspnea] : no dyspnea [Chest Tightness] : no chest tightness [Wheezing] : no wheezing [Pleuritic Pain] : no pleuritic pain [Dysrhythmia] : no dysrhythmia [Chest Discomfort] : no chest discomfort [Palpitations] : no palpitations [Murmurs] : no murmurs were heard [Edema] : ~T edema was not present [Hay Fever] : no hay fever [Itchy Eyes] : no itching of ~T the eyes [Reflux] : no reflux [Vomiting] : no vomiting [Nausea] : no nausea [Diarrhea] : no diarrhea [Constipation] : no constipation [Dysuria] : no dysuria [Abdominal Pain] : no abdominal pain [Trauma] : no ~T physical trauma [Fracture] : no fracture [Anemia] : no anemia [Headache] : no headache [Dizziness] : no dizziness [Syncope] : no fainting [Numbness] : no numbness [Paralysis] : no paralysis was seen [Seizures] : no seizures [Depression] : no depression [Anxiety] : no anxiety [Diabetes] : no diabetes mellitus [Thyroid Problem] : no thyroid problem

## 2020-04-23 NOTE — CONSULT LETTER
[Dear  ___] : Dear  [unfilled], [Consult Letter:] : I had the pleasure of evaluating your patient, [unfilled]. [Please see my note below.] : Please see my note below. [Sincerely,] : Sincerely, [Consult Closing:] : Thank you very much for allowing me to participate in the care of this patient.  If you have any questions, please do not hesitate to contact me. [DrNorah  ___] : Dr. DURAN [DrNorah ___] : Dr. DURAN [Director, Respiratory Care] : Director, Respiratory Care [[facility], [facility]] : Jose [facility] DO, [facility] [Duc Srerano MD] : Duc Serrano MD [[facility]] : [facility] [Department of Cardiovascular and Thoracic Surgery] : Department of Cardiovascular and Thoracic Surgery [] :  [[facility]] : [facility] [FreeTextEntry3] : [facility]\par Pulmonary Critical Care\par Director Pulmonary Division\par Medical Director Respiratory Therapy\par [facility]\par \par

## 2020-04-24 ENCOUNTER — OUTPATIENT (OUTPATIENT)
Dept: OUTPATIENT SERVICES | Facility: HOSPITAL | Age: 75
LOS: 1 days | End: 2020-04-24

## 2020-04-24 DIAGNOSIS — Z98.89 OTHER SPECIFIED POSTPROCEDURAL STATES: Chronic | ICD-10-CM

## 2020-04-24 DIAGNOSIS — Z85.118 PERSONAL HISTORY OF OTHER MALIGNANT NEOPLASM OF BRONCHUS AND LUNG: Chronic | ICD-10-CM

## 2020-04-25 ENCOUNTER — OUTPATIENT (OUTPATIENT)
Dept: OUTPATIENT SERVICES | Facility: HOSPITAL | Age: 75
LOS: 1 days | End: 2020-04-25

## 2020-04-25 DIAGNOSIS — Z98.89 OTHER SPECIFIED POSTPROCEDURAL STATES: Chronic | ICD-10-CM

## 2020-04-25 DIAGNOSIS — Z85.118 PERSONAL HISTORY OF OTHER MALIGNANT NEOPLASM OF BRONCHUS AND LUNG: Chronic | ICD-10-CM

## 2020-04-26 ENCOUNTER — OUTPATIENT (OUTPATIENT)
Dept: OUTPATIENT SERVICES | Facility: HOSPITAL | Age: 75
LOS: 1 days | End: 2020-04-26

## 2020-04-26 DIAGNOSIS — Z98.89 OTHER SPECIFIED POSTPROCEDURAL STATES: Chronic | ICD-10-CM

## 2020-04-26 DIAGNOSIS — Z85.118 PERSONAL HISTORY OF OTHER MALIGNANT NEOPLASM OF BRONCHUS AND LUNG: Chronic | ICD-10-CM

## 2020-04-27 ENCOUNTER — OUTPATIENT (OUTPATIENT)
Dept: OUTPATIENT SERVICES | Facility: HOSPITAL | Age: 75
LOS: 1 days | End: 2020-04-27

## 2020-04-27 DIAGNOSIS — Z98.89 OTHER SPECIFIED POSTPROCEDURAL STATES: Chronic | ICD-10-CM

## 2020-04-27 DIAGNOSIS — Z85.118 PERSONAL HISTORY OF OTHER MALIGNANT NEOPLASM OF BRONCHUS AND LUNG: Chronic | ICD-10-CM

## 2020-04-28 ENCOUNTER — OUTPATIENT (OUTPATIENT)
Dept: OUTPATIENT SERVICES | Facility: HOSPITAL | Age: 75
LOS: 1 days | End: 2020-04-28

## 2020-04-28 DIAGNOSIS — Z98.89 OTHER SPECIFIED POSTPROCEDURAL STATES: Chronic | ICD-10-CM

## 2020-04-28 DIAGNOSIS — Z85.118 PERSONAL HISTORY OF OTHER MALIGNANT NEOPLASM OF BRONCHUS AND LUNG: Chronic | ICD-10-CM

## 2020-05-01 ENCOUNTER — APPOINTMENT (OUTPATIENT)
Dept: PULMONOLOGY | Facility: CLINIC | Age: 75
End: 2020-05-01

## 2020-07-29 ENCOUNTER — APPOINTMENT (OUTPATIENT)
Dept: PULMONOLOGY | Facility: CLINIC | Age: 75
End: 2020-07-29

## 2020-11-19 NOTE — ED ADULT TRIAGE NOTE - CCCP TRG CHIEF CMPLNT
Patient initially escalated due to no response, however patient entered vitals prior to phone call. Vital signs and symptoms did not trigger a second escalation; therefore, no follow up call is needed at this time.    Reason for Disposition   Caller has cancelled the call before the first contact    Protocols used: NO CONTACT OR DUPLICATE CONTACT CALL-A-[place]       shortness of breath

## 2021-07-22 NOTE — ASU PREOP CHECKLIST - WEIGHT IN KG
[FreeTextEntry1] : ## persistent atrial fibrillation s/p DCCV (x2)\par ## HFrEF\par ## Mitral Regurgitation\par \par - Feels better. Remains in euvolemic.\par - Repeat echo showed mild to mod MR\par - In view of AF/RVR and Nonischemic cardiomyopathy, we discussed management options including ablation vs AAD. Pros-cons discussed.\par We discussed multiple therapeutic options for the treatment of atrial fibrillation, including undergoing an atrial fibrillation/left atrial antral isolation ablation. The details of the procedure and risks associated with undergoing an atrial fibrillation/ANASTASIA ablation were discussed in detail including, but not limited to, death, myocardial ischemia, stroke, cardiac perforation, pulmonary vein stenosis, diaphragmatic paralysis via phrenic nerve injury, catheter entrapment in the mitral valve or other location, bleeding, infection, deep vein thrombosis, vascular injury, and worsening atrial arrhythmias. We also discussed that there is a low risk of possible esophageal ulceration, atrial esophageal fistula, atrial bronchial fistula, or another complication requiring the need for major surgery to address.\par \par We also discussed that recurrent atrial fibrillation in the first 2-3 months post procedure is a part of the healing process and has no impact on the overall longer- term success of the ablation. To try to reduce the incidence of these events, the plan will be for antiarrhythmic therapy to be restarted post procedure and continued for the first 3 months after ablation. \par - Continue amio, metoprolol and Xarelto for now.\par - We will plan to DC amiodarone after ablation 118

## 2022-01-18 NOTE — ED ADULT NURSE NOTE - CADM POA VASCULAR ACCESS TYPE
Patient notified via detailed message left on patient's answering machine reminding patient to contact the Gastro department to schedule appointment.       other

## 2023-09-11 NOTE — ED ADULT TRIAGE NOTE - NS ED NURSE BANDS TYPE
09:10 AM     02/20/2023 09:10 AM    MPV 10.9 02/20/2023 09:10 AM        Lipids   Lab Results   Component Value Date/Time    CHOL 144 02/20/2023 09:10 AM    TRIG 93 02/20/2023 09:10 AM    HDL 49 02/20/2023 09:10 AM    LDLCALC 76.4 02/20/2023 09:10 AM    LABVLDL 18.6 02/20/2023 09:10 AM    CHOLHDLRATIO 2.9 02/20/2023 09:10 AM       A1c:   Hemoglobin A1C   Date Value Ref Range Status   12/27/2021 6.2 (H) 4.2 - 5.6 % Final     Comment:     (NOTE)  HbA1C Interpretive Ranges  <5.7              Normal  5.7 - 6.4         Consider Prediabetes  >6.5              Consider Diabetes     09/25/2020 5.9 (H) 4.2 - 5.6 % Final     Comment:     (NOTE)  HbA1C Interpretive Ranges  <5.7              Normal  5.7 - 6.4         Consider Prediabetes  >6.5              Consider Diabetes           Microalbumin: No results found for: \"MALBCR\"    TSH: No results found for: \"TSH\"    Vit D: No results found for: \"VITD25\"      Allergies   Allergen Reactions    Amlodipine Swelling     Ankle swelling        ROS:  Review of Systems    Medication :  Current Outpatient Medications   Medication Sig Dispense Refill    traZODone (DESYREL) 50 MG tablet TAKE ONE TABLET BY MOUTH ONCE NIGHTLY 90 tablet 1    Multiple Vitamin (MULTIVITAMIN ADULT) TABS Take by mouth      albuterol sulfate HFA (PROVENTIL;VENTOLIN;PROAIR) 108 (90 Base) MCG/ACT inhaler Inhale 2 puffs into the lungs every 4 hours as needed for Wheezing 18 g 0    aspirin 81 MG chewable tablet Take 1 tablet by mouth daily      atorvastatin (LIPITOR) 40 MG tablet Take 1 tablet by mouth at bedtime      carvedilol (COREG) 12.5 MG tablet Take 1 tablet by mouth 2 times daily (with meals)      chlorthalidone (HYGROTON) 25 MG tablet Take 1 tablet by mouth daily      vitamin D 25 MCG (1000 UT) CAPS Take 1 capsule by mouth daily      lisinopril (PRINIVIL;ZESTRIL) 10 MG tablet Take 1 tablet by mouth daily      nitroGLYCERIN (NITROSTAT) 0.4 MG SL tablet Place 1 tablet under the tongue every 5 minutes as
Name band;

## 2023-12-04 NOTE — ED PROVIDER NOTE - NEURO NEGATIVE STATEMENT, MLM
----- Message from Vy Thomas MD sent at 12/4/2023  7:51 AM CST -----  Patient with obstructive sleep apnea. This titration study confirms the need for CPAP. Please arrange for CPAP with recommended settings.
Called patient and informed patient of below. Patient verbalized understanding and had no questions.
no loss of consciousness, no gait abnormality, no headache, no sensory deficits, and no weakness.